# Patient Record
Sex: FEMALE | Race: WHITE | Employment: UNEMPLOYED | URBAN - METROPOLITAN AREA
[De-identification: names, ages, dates, MRNs, and addresses within clinical notes are randomized per-mention and may not be internally consistent; named-entity substitution may affect disease eponyms.]

---

## 2017-02-21 ENCOUNTER — TELEPHONE (OUTPATIENT)
Dept: OBGYN CLINIC | Facility: CLINIC | Age: 37
End: 2017-02-21

## 2017-02-28 ENCOUNTER — OFFICE VISIT (OUTPATIENT)
Dept: OBGYN CLINIC | Facility: CLINIC | Age: 37
End: 2017-02-28

## 2017-02-28 VITALS
HEART RATE: 68 BPM | DIASTOLIC BLOOD PRESSURE: 81 MMHG | SYSTOLIC BLOOD PRESSURE: 112 MMHG | WEIGHT: 127 LBS | BODY MASS INDEX: 20.41 KG/M2 | HEIGHT: 66.25 IN

## 2017-02-28 DIAGNOSIS — Z01.419 WOMEN'S ANNUAL ROUTINE GYNECOLOGICAL EXAMINATION: Primary | ICD-10-CM

## 2017-02-28 PROCEDURE — 99395 PREV VISIT EST AGE 18-39: CPT | Performed by: ADVANCED PRACTICE MIDWIFE

## 2017-02-28 NOTE — PROGRESS NOTES
HPI:    Patient ID: Mikki Sharma is a 31year old female who presents for annual exam.  Using condoms for contraception  In a stable non abusive monogamous relationship. Planning pregnancy in 1 year.     Gyn Exam        Review of Systems   Constitutional

## 2017-03-01 LAB — HPV I/H RISK 1 DNA SPEC QL NAA+PROBE: NEGATIVE

## 2017-05-20 ENCOUNTER — HOSPITAL ENCOUNTER (EMERGENCY)
Facility: HOSPITAL | Age: 37
Discharge: HOME | End: 2017-05-20
Attending: EMERGENCY MEDICINE
Payer: COMMERCIAL

## 2017-05-20 VITALS
WEIGHT: 125 LBS | HEIGHT: 66 IN | SYSTOLIC BLOOD PRESSURE: 100 MMHG | OXYGEN SATURATION: 100 % | DIASTOLIC BLOOD PRESSURE: 61 MMHG | TEMPERATURE: 98 F | BODY MASS INDEX: 20.09 KG/M2 | RESPIRATION RATE: 18 BRPM | HEART RATE: 52 BPM

## 2017-05-20 DIAGNOSIS — R55 SYNCOPE, VASOVAGAL: ICD-10-CM

## 2017-05-20 DIAGNOSIS — M54.31 SCIATICA OF RIGHT SIDE: Primary | ICD-10-CM

## 2017-05-20 PROCEDURE — 96361 HYDRATE IV INFUSION ADD-ON: CPT

## 2017-05-20 PROCEDURE — 99284 EMERGENCY DEPT VISIT MOD MDM: CPT

## 2017-05-20 PROCEDURE — 81025 URINE PREGNANCY TEST: CPT

## 2017-05-20 PROCEDURE — 87086 URINE CULTURE/COLONY COUNT: CPT | Performed by: EMERGENCY MEDICINE

## 2017-05-20 PROCEDURE — 85025 COMPLETE CBC W/AUTO DIFF WBC: CPT | Performed by: EMERGENCY MEDICINE

## 2017-05-20 PROCEDURE — 81001 URINALYSIS AUTO W/SCOPE: CPT | Performed by: EMERGENCY MEDICINE

## 2017-05-20 PROCEDURE — 93010 ELECTROCARDIOGRAM REPORT: CPT | Performed by: EMERGENCY MEDICINE

## 2017-05-20 PROCEDURE — 93005 ELECTROCARDIOGRAM TRACING: CPT

## 2017-05-20 PROCEDURE — 96374 THER/PROPH/DIAG INJ IV PUSH: CPT

## 2017-05-20 PROCEDURE — 80048 BASIC METABOLIC PNL TOTAL CA: CPT | Performed by: EMERGENCY MEDICINE

## 2017-05-20 RX ORDER — CYCLOBENZAPRINE HCL 10 MG
10 TABLET ORAL 3 TIMES DAILY PRN
Qty: 20 TABLET | Refills: 0 | Status: SHIPPED | OUTPATIENT
Start: 2017-05-20 | End: 2017-05-27

## 2017-05-20 RX ORDER — IBUPROFEN 600 MG/1
600 TABLET ORAL EVERY 8 HOURS PRN
Qty: 30 TABLET | Refills: 0 | Status: SHIPPED | OUTPATIENT
Start: 2017-05-20 | End: 2017-05-27

## 2017-05-20 RX ORDER — KETOROLAC TROMETHAMINE 30 MG/ML
15 INJECTION, SOLUTION INTRAMUSCULAR; INTRAVENOUS ONCE
Status: COMPLETED | OUTPATIENT
Start: 2017-05-20 | End: 2017-05-20

## 2017-05-20 NOTE — ED INITIAL ASSESSMENT (HPI)
Pt is c/o back pain for 2 weeks and she went to the bathroom last night and passed out. Pt denies HA or dizziness.

## 2017-05-20 NOTE — ED PROVIDER NOTES
Patient Seen in: [facility] Emergency Department    History   Patient presents with:  Back Pain (musculoskeletal)    Stated Complaint: back pain    HPI    82-year-old female presents for syncopal episode.   Patient states that she woke up to use th Tab,  Take 1 tablet (10 mg total) by mouth 3 (three) times daily as needed for Muscle spasms. Misc.  Devices (BREAST PUMP) Does not apply Misc,  AS DIRECTED Please supply a double electric breast pump that is covered under patient's insurance Due Date 12/ 100%  LMP 05/08/2017        Physical Exam   Constitutional: She is oriented to person, place, and time. She appears well-developed and well-nourished. HENT:   Head: Normocephalic and atraumatic.    Right Ear: External ear normal.   Left Ear: External ear Right knee: Normal.        Left knee: Normal.        Right ankle: Normal.        Left ankle: Normal.        Cervical back: Normal.        Thoracic back: Normal.        Lumbar back: Normal. She exhibits normal range of motion, no tenderness, no bony tendern DRAW GOLD   RAINBOW DRAW LAVENDER   RAINBOW DRAW LIGHT GREEN   RAINBOW DRAW DARK GREEN   RAINBOW DRAW LAVENDER TALL (BNP)   URINE CULTURE, ROUTINE      EKG    Rate, intervals and axes as noted on EKG Report.   Rate: 55  Rhythm: Sinus Rhythm  Reading: Bradyc visit  Primary care follow up if you don't have a PCP      Medications Prescribed:  Discharge Medication List as of 5/20/2017 10:54 AM    START taking these medications    ibuprofen 600 MG Oral Tab  Take 1 tablet (600 mg total) by mouth every 8 (eight) amie

## 2017-06-16 ENCOUNTER — HOSPITAL ENCOUNTER (EMERGENCY)
Facility: HOSPITAL | Age: 37
Discharge: HOME | End: 2017-06-16
Payer: COMMERCIAL

## 2017-06-16 VITALS
OXYGEN SATURATION: 99 % | WEIGHT: 125 LBS | DIASTOLIC BLOOD PRESSURE: 54 MMHG | SYSTOLIC BLOOD PRESSURE: 89 MMHG | BODY MASS INDEX: 20.09 KG/M2 | HEART RATE: 62 BPM | TEMPERATURE: 98 F | HEIGHT: 66 IN | RESPIRATION RATE: 16 BRPM

## 2017-06-16 DIAGNOSIS — B34.9 VIRAL ILLNESS: Primary | ICD-10-CM

## 2017-06-16 PROCEDURE — 99284 EMERGENCY DEPT VISIT MOD MDM: CPT

## 2017-06-16 PROCEDURE — 81001 URINALYSIS AUTO W/SCOPE: CPT | Performed by: NURSE PRACTITIONER

## 2017-06-16 PROCEDURE — 96361 HYDRATE IV INFUSION ADD-ON: CPT

## 2017-06-16 PROCEDURE — 96374 THER/PROPH/DIAG INJ IV PUSH: CPT

## 2017-06-16 PROCEDURE — 85025 COMPLETE CBC W/AUTO DIFF WBC: CPT | Performed by: NURSE PRACTITIONER

## 2017-06-16 PROCEDURE — 80076 HEPATIC FUNCTION PANEL: CPT | Performed by: NURSE PRACTITIONER

## 2017-06-16 PROCEDURE — 83690 ASSAY OF LIPASE: CPT | Performed by: NURSE PRACTITIONER

## 2017-06-16 PROCEDURE — 80048 BASIC METABOLIC PNL TOTAL CA: CPT | Performed by: NURSE PRACTITIONER

## 2017-06-16 RX ORDER — ONDANSETRON 2 MG/ML
4 INJECTION INTRAMUSCULAR; INTRAVENOUS ONCE
Status: COMPLETED | OUTPATIENT
Start: 2017-06-16 | End: 2017-06-16

## 2017-06-16 NOTE — ED INITIAL ASSESSMENT (HPI)
Pt came in for N/V/D since Tuesday. Went to [facility] last night but still unable to keep food down. Afebrile. Reports daughter had similar symptoms last week. RR even and nonlabored, ambulatory with steady gait.

## 2017-06-16 NOTE — ED PROVIDER NOTES
Patient Seen in: [facility] AND [facility] Emergency Department    History   Patient presents with:  Nausea/Vomiting/Diarrhea (gastrointestinal)    Stated Complaint: vomiting and diarrhea.      HPI    82-year-old female presents to the emergency department compla Alcohol Use: Yes           2.0 oz/week       4 Glasses of wine per week       Comment: social      Review of Systems    Positive for stated complaint: vomiting and diarrhea. Other systems are as noted in HPI.   Constitutional and vital sign 18 (*)     All other components within normal limits   BASIC METABOLIC PANEL (8) - Normal   HEPATIC FUNCTION PANEL (7) - Normal   CBC WITH DIFFERENTIAL WITH PLATELET    Narrative:      The following orders were created for panel order CBC WITH DIFFERENTIAL

## 2017-09-18 ENCOUNTER — OFFICE VISIT (OUTPATIENT)
Dept: OBGYN CLINIC | Facility: CLINIC | Age: 37
End: 2017-09-18

## 2017-09-18 VITALS
DIASTOLIC BLOOD PRESSURE: 70 MMHG | SYSTOLIC BLOOD PRESSURE: 106 MMHG | BODY MASS INDEX: 20 KG/M2 | WEIGHT: 123 LBS | HEART RATE: 58 BPM

## 2017-09-18 DIAGNOSIS — N92.6 MISSED MENSES: Primary | ICD-10-CM

## 2017-09-18 LAB
CONTROL LINE PRESENT WITH A CLEAR BACKGROUND (YES/NO): YES YES/NO
KIT LOT #: 0 NUMERIC

## 2017-09-18 PROCEDURE — 99213 OFFICE O/P EST LOW 20 MIN: CPT | Performed by: ADVANCED PRACTICE MIDWIFE

## 2017-09-18 PROCEDURE — 81025 URINE PREGNANCY TEST: CPT | Performed by: ADVANCED PRACTICE MIDWIFE

## 2017-09-19 NOTE — PROGRESS NOTES
HPI:   Liz Kirkland is a 31year old female who presents for a missed menses visit. Happy about pregnancy.      Wt Readings from Last 3 Encounters:  09/18/17 : 123 lb (55.8 kg)  06/16/17 : 125 lb (56.7 kg)  05/20/17 : 125 lb (56.7 kg)    Body mass index i Social History:   Smoking status: Never Smoker                                                              Smokeless tobacco: Never Used                      Alcohol use: Yes           2.0 oz/week     Glasses of wine: 4 per week     Comment: social

## 2017-09-20 ENCOUNTER — APPOINTMENT (OUTPATIENT)
Dept: LAB | Facility: HOSPITAL | Age: 37
End: 2017-09-20
Attending: ADVANCED PRACTICE MIDWIFE
Payer: COMMERCIAL

## 2017-09-20 ENCOUNTER — HOSPITAL ENCOUNTER (OUTPATIENT)
Dept: ULTRASOUND IMAGING | Facility: HOSPITAL | Age: 37
Discharge: HOME | End: 2017-09-20
Attending: ADVANCED PRACTICE MIDWIFE
Payer: COMMERCIAL

## 2017-09-20 ENCOUNTER — OFFICE VISIT (OUTPATIENT)
Dept: OBGYN CLINIC | Facility: CLINIC | Age: 37
End: 2017-09-20

## 2017-09-20 ENCOUNTER — TELEPHONE (OUTPATIENT)
Dept: PEDIATRICS CLINIC | Facility: CLINIC | Age: 37
End: 2017-09-20

## 2017-09-20 ENCOUNTER — TELEPHONE (OUTPATIENT)
Dept: OBGYN CLINIC | Facility: CLINIC | Age: 37
End: 2017-09-20

## 2017-09-20 VITALS
BODY MASS INDEX: 19.53 KG/M2 | SYSTOLIC BLOOD PRESSURE: 108 MMHG | WEIGHT: 123 LBS | HEIGHT: 66.5 IN | DIASTOLIC BLOOD PRESSURE: 74 MMHG | HEART RATE: 57 BPM

## 2017-09-20 DIAGNOSIS — O20.0 THREATENED ABORTION IN EARLY PREGNANCY: Primary | ICD-10-CM

## 2017-09-20 DIAGNOSIS — O20.9 VAGINAL BLEEDING IN PREGNANCY, FIRST TRIMESTER: Primary | ICD-10-CM

## 2017-09-20 DIAGNOSIS — O20.9 VAGINAL BLEEDING IN PREGNANCY, FIRST TRIMESTER: ICD-10-CM

## 2017-09-20 DIAGNOSIS — O20.9 BLEEDING IN EARLY PREGNANCY: ICD-10-CM

## 2017-09-20 LAB
B-HCG SERPL-ACNC: 2031 MIU/ML
PROGEST SERPL-MCNC: 2.8 NG/ML

## 2017-09-20 PROCEDURE — 84144 ASSAY OF PROGESTERONE: CPT

## 2017-09-20 PROCEDURE — 36415 COLL VENOUS BLD VENIPUNCTURE: CPT

## 2017-09-20 PROCEDURE — 99214 OFFICE O/P EST MOD 30 MIN: CPT | Performed by: ADVANCED PRACTICE MIDWIFE

## 2017-09-20 PROCEDURE — 76801 OB US < 14 WKS SINGLE FETUS: CPT | Performed by: ADVANCED PRACTICE MIDWIFE

## 2017-09-20 PROCEDURE — 76817 TRANSVAGINAL US OBSTETRIC: CPT | Performed by: ADVANCED PRACTICE MIDWIFE

## 2017-09-20 PROCEDURE — 84702 CHORIONIC GONADOTROPIN TEST: CPT

## 2017-09-20 NOTE — TELEPHONE ENCOUNTER
Spoke with pt who reports today at [facility] [address] she began having red/brown spotting. Pt reports she is wearing a pantiliner and has not soaked it. Pt denies abdominal cramping. Pt states she had intercourse 2 days ago and had a BM this AM with slight straining.  Pt

## 2017-09-20 NOTE — PROGRESS NOTES
HPI:   Dylon Witt is a 31year old female who presents for Patient presents with:  Gyn Exam: Spotting in early pregnancy pinkish brown  from 8am til 2pm  [place] 2 days ago.      Wt Readings from Last 3 Encounters:  09/20/17 : 123 lb (55.8 kg)  09/1 Maternal Grandmother      Ankylosing Spondyltis   • Dementia Father       Social History:   Smoking status: Never Smoker                                                              Smokeless tobacco: Never Used                      Alcohol use:  Yes management at this time.  Follow HCGs weekly until normal.       Chao Hicks CNM  9/20/2017  5:15 PM

## 2017-09-21 ENCOUNTER — TELEPHONE (OUTPATIENT)
Dept: PEDIATRICS CLINIC | Facility: CLINIC | Age: 37
End: 2017-09-21

## 2017-09-21 DIAGNOSIS — O03.9 SAB (SPONTANEOUS ABORTION): Primary | ICD-10-CM

## 2017-09-21 NOTE — TELEPHONE ENCOUNTER
Spoke to patient, stated she has been having cramping and bleeding since midnight. Patient stated she did have couple clots. Patient stated the cramping and bleeding was worse yesterday.  Patient stated bleeding is heavy now but now soaking more than a pad

## 2017-09-21 NOTE — TELEPHONE ENCOUNTER
Per MJ on call, patient is to monitor and take ibuprofen as needed for the pain. Patient informed mj suggesting to repeat levels tomorrow. Patient informed they will monitor the levels until back to zero.  Patient advise she should notice the bleeding to st

## 2017-09-22 ENCOUNTER — APPOINTMENT (OUTPATIENT)
Dept: LAB | Facility: HOSPITAL | Age: 37
End: 2017-09-22
Attending: ADVANCED PRACTICE MIDWIFE
Payer: COMMERCIAL

## 2017-09-22 DIAGNOSIS — O03.9 SAB (SPONTANEOUS ABORTION): ICD-10-CM

## 2017-09-22 LAB — B-HCG SERPL-ACNC: 324.2 MIU/ML

## 2017-09-22 PROCEDURE — 36415 COLL VENOUS BLD VENIPUNCTURE: CPT

## 2017-09-22 PROCEDURE — 84702 CHORIONIC GONADOTROPIN TEST: CPT

## 2017-09-23 DIAGNOSIS — O03.9 SPONTANEOUS MISCARRIAGE: Primary | ICD-10-CM

## 2017-10-10 ENCOUNTER — APPOINTMENT (OUTPATIENT)
Dept: LAB | Facility: HOSPITAL | Age: 37
End: 2017-10-10
Attending: ADVANCED PRACTICE MIDWIFE
Payer: COMMERCIAL

## 2017-10-10 DIAGNOSIS — O03.9 SAB (SPONTANEOUS ABORTION): ICD-10-CM

## 2017-10-10 PROCEDURE — 84702 CHORIONIC GONADOTROPIN TEST: CPT

## 2017-10-10 PROCEDURE — 36415 COLL VENOUS BLD VENIPUNCTURE: CPT

## 2017-10-11 ENCOUNTER — OFFICE VISIT (OUTPATIENT)
Dept: OBGYN CLINIC | Facility: CLINIC | Age: 37
End: 2017-10-11

## 2017-10-11 VITALS
HEIGHT: 66 IN | DIASTOLIC BLOOD PRESSURE: 78 MMHG | HEART RATE: 80 BPM | WEIGHT: 124 LBS | BODY MASS INDEX: 19.93 KG/M2 | SYSTOLIC BLOOD PRESSURE: 121 MMHG

## 2017-10-11 DIAGNOSIS — O03.9 COMPLETE ABORTION WITHOUT COMPLICATION: Primary | ICD-10-CM

## 2017-10-11 PROCEDURE — 99213 OFFICE O/P EST LOW 20 MIN: CPT | Performed by: ADVANCED PRACTICE MIDWIFE

## 2017-10-19 NOTE — PROGRESS NOTES
HPI:   Celia Fine is a 30year old female who presents for a follow up from [facility]. Stopped bleeding. Coping well. Has questions about future pregnancy.     Wt Readings from Last 3 Encounters:  10/11/17 : 124 lb (56.2 kg)  09/20/17 : 123 lb (55.8 kg)  09/1 Grandmother      Ankylosing Spondyltis   • Dementia Father       Social History:   Smoking status: Never Smoker                                                              Smokeless tobacco: Never Used                      Alcohol use: Yes           2.0 o

## 2017-12-14 ENCOUNTER — TELEPHONE (OUTPATIENT)
Dept: OBGYN CLINIC | Facility: CLINIC | Age: 37
End: 2017-12-14

## 2017-12-14 NOTE — TELEPHONE ENCOUNTER
Pt states she did not have it done because last result was 2.7, which was close to zero & it was $20 for the test. Pt states she has just gotten her 3rd period since her miscarriage.  Advised pt I will remove order per her request. Pt verbalized an Juany

## 2018-02-07 ENCOUNTER — TELEPHONE (OUTPATIENT)
Dept: OBGYN CLINIC | Facility: CLINIC | Age: 38
End: 2018-02-07

## 2018-02-07 NOTE — TELEPHONE ENCOUNTER
Pt had light spotting yesterday & some brown discharge w/ cramping today. Not a heavy flow. Pt to have pap today. Advised pt that pap can be done except for when she has a heavy flow. Appt for 1800 today. Pt will call to reschedule if flow increases.  Pt ve

## 2018-02-15 ENCOUNTER — OFFICE VISIT (OUTPATIENT)
Dept: OBGYN CLINIC | Facility: CLINIC | Age: 38
End: 2018-02-15

## 2018-02-15 VITALS
BODY MASS INDEX: 20 KG/M2 | HEART RATE: 85 BPM | SYSTOLIC BLOOD PRESSURE: 93 MMHG | DIASTOLIC BLOOD PRESSURE: 65 MMHG | WEIGHT: 126.19 LBS

## 2018-02-15 DIAGNOSIS — Z32.00 PREGNANCY EXAMINATION OR TEST, PREGNANCY UNCONFIRMED: ICD-10-CM

## 2018-02-15 DIAGNOSIS — Z31.9 PATIENT DESIRES PREGNANCY: ICD-10-CM

## 2018-02-15 DIAGNOSIS — Z01.419 ENCOUNTER FOR ANNUAL ROUTINE GYNECOLOGICAL EXAMINATION: Primary | ICD-10-CM

## 2018-02-15 LAB
CONTROL LINE PRESENT WITH A CLEAR BACKGROUND (YES/NO): YES YES/NO
KIT LOT #: 0 NUMERIC
PREGNANCY TEST, URINE: NEGATIVE

## 2018-02-15 PROCEDURE — 99395 PREV VISIT EST AGE 18-39: CPT | Performed by: ADVANCED PRACTICE MIDWIFE

## 2018-02-15 PROCEDURE — 81025 URINE PREGNANCY TEST: CPT | Performed by: ADVANCED PRACTICE MIDWIFE

## 2018-02-15 RX ORDER — INFLUENZA A VIRUS A/CHRISTCHURCH/16/2010 NIB-74 (H1N1) HEMAGGLUTININ ANTIGEN (PROPIOLACTONE INACTIVATED), INFLUENZA A VIRUS A/SWITZERLAND/9715293/2013, NIB-88 (H3N2) HEMAGGLUTININ ANTIGEN (PROPIOLACTONE INACTIVATED), INFLUENZA B VIRUS B/PHUKET/3073/2013 - WILD TYPE HEMAGGLUTININ ANTIGEN (PROPIOLACTONE INACTIVATED) 15; 15; 15 UG/.5ML; UG/.5ML; UG/.5ML
INJECTION, SUSPENSION INTRAMUSCULAR
Refills: 0 | COMMUNITY
Start: 2018-01-06 | End: 2018-06-27

## 2018-02-15 NOTE — PROGRESS NOTES
HPI:    Patient ID: Carlos Vazquez is a 30year old female who presents for her annual exam. Pt has a hx of ASCUS pap with negative HPV. Desires pregnancy and is taking Folic acid daily  Denies ETOH, drug or tobacco use.   Lives in a stable monogamous rela dry.   Psychiatric: She has a normal mood and affect.  Her behavior is normal.              ASSESSMENT/PLAN:   Encounter for annual routine gynecological examination  (primary encounter diagnosis)  Pregnancy examination or test, pregnancy unconfirmed  Patie

## 2018-02-16 LAB — HPV I/H RISK 1 DNA SPEC QL NAA+PROBE: NEGATIVE

## 2018-06-07 ENCOUNTER — APPOINTMENT (OUTPATIENT)
Dept: LAB | Facility: HOSPITAL | Age: 38
End: 2018-06-07
Attending: ADVANCED PRACTICE MIDWIFE
Payer: COMMERCIAL

## 2018-06-07 ENCOUNTER — TELEPHONE (OUTPATIENT)
Dept: OBGYN CLINIC | Facility: CLINIC | Age: 38
End: 2018-06-07

## 2018-06-07 ENCOUNTER — OFFICE VISIT (OUTPATIENT)
Dept: OBGYN CLINIC | Facility: CLINIC | Age: 38
End: 2018-06-07

## 2018-06-07 VITALS
WEIGHT: 125 LBS | BODY MASS INDEX: 20 KG/M2 | DIASTOLIC BLOOD PRESSURE: 72 MMHG | HEART RATE: 68 BPM | SYSTOLIC BLOOD PRESSURE: 108 MMHG

## 2018-06-07 DIAGNOSIS — O36.80X0 PREGNANCY WITH UNCERTAIN FETAL VIABILITY, SINGLE OR UNSPECIFIED FETUS: Primary | ICD-10-CM

## 2018-06-07 DIAGNOSIS — N92.6 MISSED MENSES: ICD-10-CM

## 2018-06-07 DIAGNOSIS — Z87.59 HISTORY OF ONE MISCARRIAGE: ICD-10-CM

## 2018-06-07 DIAGNOSIS — N92.6 MISSED MENSES: Primary | ICD-10-CM

## 2018-06-07 PROCEDURE — 36415 COLL VENOUS BLD VENIPUNCTURE: CPT

## 2018-06-07 PROCEDURE — 84144 ASSAY OF PROGESTERONE: CPT

## 2018-06-07 PROCEDURE — 84702 CHORIONIC GONADOTROPIN TEST: CPT

## 2018-06-07 PROCEDURE — 99213 OFFICE O/P EST LOW 20 MIN: CPT | Performed by: ADVANCED PRACTICE MIDWIFE

## 2018-06-07 PROCEDURE — 81025 URINE PREGNANCY TEST: CPT | Performed by: ADVANCED PRACTICE MIDWIFE

## 2018-06-07 NOTE — PROGRESS NOTES
Pt is a  with hx of  A  and early SAB. Denies any bleeding or cramping. HCG today and in 48 hrs  1. Discussed importance of folic acid, calcium.    2.  Reviewed pregnancy recommendations regarding weight gain, diet, fish consumption, consumption o

## 2018-06-07 NOTE — TELEPHONE ENCOUNTER
----- Message from Catherine Denton CNM sent at 6/7/2018  1:38 PM CDT -----  BHCG and progesterone look good  Repeat BHCG in 48 hrs  Order is in

## 2018-06-09 ENCOUNTER — LAB ENCOUNTER (OUTPATIENT)
Dept: LAB | Facility: HOSPITAL | Age: 38
End: 2018-06-09
Attending: ADVANCED PRACTICE MIDWIFE
Payer: COMMERCIAL

## 2018-06-09 DIAGNOSIS — O36.80X0 PREGNANCY WITH UNCERTAIN FETAL VIABILITY, SINGLE OR UNSPECIFIED FETUS: ICD-10-CM

## 2018-06-09 LAB — B-HCG SERPL-ACNC: 6958 MIU/ML

## 2018-06-09 PROCEDURE — 84702 CHORIONIC GONADOTROPIN TEST: CPT

## 2018-06-09 PROCEDURE — 36415 COLL VENOUS BLD VENIPUNCTURE: CPT

## 2018-06-14 ENCOUNTER — TELEPHONE (OUTPATIENT)
Dept: OBGYN CLINIC | Facility: CLINIC | Age: 38
End: 2018-06-14

## 2018-06-14 DIAGNOSIS — Z3A.01 LESS THAN 8 WEEKS GESTATION OF PREGNANCY: Primary | ICD-10-CM

## 2018-06-14 NOTE — TELEPHONE ENCOUNTER
----- Message from Yulisa Serrano CNM sent at 6/14/2018  2:35 PM CDT -----  HCG just about doubled  Offer u/s for viability if desires   RN NOB visit

## 2018-06-14 NOTE — TELEPHONE ENCOUNTER
Spoke with pt and advised per MES her HCG just about doubled. Pt was offered U/S for viability, pt desires. Order placed for U/S and instructions given. Pt scheduled for phone nurse ed visit. Pt agreed and voiced understanding.

## 2018-06-20 ENCOUNTER — HOSPITAL ENCOUNTER (OUTPATIENT)
Dept: ULTRASOUND IMAGING | Facility: HOSPITAL | Age: 38
Discharge: HOME | End: 2018-06-20
Attending: ADVANCED PRACTICE MIDWIFE
Payer: COMMERCIAL

## 2018-06-20 ENCOUNTER — TELEPHONE (OUTPATIENT)
Dept: OBGYN CLINIC | Facility: CLINIC | Age: 38
End: 2018-06-20

## 2018-06-20 ENCOUNTER — NURSE ONLY (OUTPATIENT)
Dept: OBGYN CLINIC | Facility: CLINIC | Age: 38
End: 2018-06-20

## 2018-06-20 ENCOUNTER — OFFICE VISIT (OUTPATIENT)
Dept: OBGYN CLINIC | Facility: CLINIC | Age: 38
End: 2018-06-20

## 2018-06-20 VITALS
BODY MASS INDEX: 20 KG/M2 | HEART RATE: 63 BPM | DIASTOLIC BLOOD PRESSURE: 71 MMHG | WEIGHT: 126 LBS | SYSTOLIC BLOOD PRESSURE: 107 MMHG

## 2018-06-20 DIAGNOSIS — O20.0 THREATENED ABORTION: ICD-10-CM

## 2018-06-20 DIAGNOSIS — O20.0 THREATENED ABORTION: Primary | ICD-10-CM

## 2018-06-20 DIAGNOSIS — O20.9 BLEEDING IN EARLY PREGNANCY: Primary | ICD-10-CM

## 2018-06-20 PROCEDURE — 76801 OB US < 14 WKS SINGLE FETUS: CPT | Performed by: ADVANCED PRACTICE MIDWIFE

## 2018-06-20 PROCEDURE — 76817 TRANSVAGINAL US OBSTETRIC: CPT | Performed by: ADVANCED PRACTICE MIDWIFE

## 2018-06-20 PROCEDURE — 99213 OFFICE O/P EST LOW 20 MIN: CPT | Performed by: ADVANCED PRACTICE MIDWIFE

## 2018-06-20 NOTE — TELEPHONE ENCOUNTER
Pt reported she has been having light pink - dk brown vaginal spotting with wiping since Friday and slight abdominal cramping. Pt states she moved this past weekend and things are hectic. Pt has an US scheduled for tomorrow night.   Blood Type is O Positi

## 2018-06-20 NOTE — PROGRESS NOTES
[facility] Focused Gynecology Problem Exam    Tierra Yang is a 30year old female presenting for Gyn Exam (had positive pregnancy test at home. started spotting on and off since Thursday )  .     HPI:   Patient presents with:  Gyn Exam: h Social History Main Topics   Smoking status: Never Smoker    Smokeless tobacco: Never Used    Alcohol use Yes  2.0 oz/week    4 Glasses of wine per week         Comment: social    Drug use: No    Sexual activity: Not on file     Other Topics Concern the right side of the gestational sac measuring 10 x 4 x 6 mm. PLAN:   NOB ed visit  F/U if bleeding increases  Warning signs reviewed    ORDERS:   No orders of the defined types were placed in this encounter.     PRESCRIPTIONS:     No prescriptions requ

## 2018-06-21 NOTE — PROGRESS NOTES
Appointment was cx'ed due to pt was having vaginal bleeding and needed a Hold and Call US and appointment with the midwife.

## 2018-06-27 ENCOUNTER — NURSE ONLY (OUTPATIENT)
Dept: OBGYN CLINIC | Facility: CLINIC | Age: 38
End: 2018-06-27

## 2018-06-27 DIAGNOSIS — O09.521 AMA (ADVANCED MATERNAL AGE) MULTIGRAVIDA 35+, FIRST TRIMESTER: Primary | ICD-10-CM

## 2018-06-27 PROBLEM — O09.529 AMA (ADVANCED MATERNAL AGE) MULTIGRAVIDA 35+ (HCC): Status: ACTIVE | Noted: 2018-06-27

## 2018-06-27 PROBLEM — O09.529 AMA (ADVANCED MATERNAL AGE) MULTIGRAVIDA 35+: Status: ACTIVE | Noted: 2018-06-27

## 2018-06-27 NOTE — PROGRESS NOTES
Phone Consult. NOB Education complete and information set aside for pt to . Pt is AMA. 1 Hr GTT, HA1C and TSH ordered with NOB Labs. MFM consult ordered with FTS and Cell Free Fetal DNA testing. Pt will call for appt. NPN appt scheduled.

## 2018-06-27 NOTE — ASSESSMENT & PLAN NOTE
MFM consult ordered. FTS and Cell Free Fetal DNA ordered.   1 Hr GTT, HA1C and TSH ordered with NOB Labs

## 2018-07-09 ENCOUNTER — LAB ENCOUNTER (OUTPATIENT)
Dept: LAB | Facility: HOSPITAL | Age: 38
End: 2018-07-09
Attending: ADVANCED PRACTICE MIDWIFE
Payer: COMMERCIAL

## 2018-07-09 DIAGNOSIS — O09.521 AMA (ADVANCED MATERNAL AGE) MULTIGRAVIDA 35+, FIRST TRIMESTER: ICD-10-CM

## 2018-07-09 LAB
ANTIBODY SCREEN: NEGATIVE
BASOPHILS # BLD: 0 K/UL (ref 0–0.2)
BASOPHILS NFR BLD: 0 %
EOSINOPHIL # BLD: 0 K/UL (ref 0–0.7)
EOSINOPHIL NFR BLD: 0 %
ERYTHROCYTE [DISTWIDTH] IN BLOOD BY AUTOMATED COUNT: 12.1 % (ref 11–15)
GLUCOSE 1H P 50 G GLC PO SERPL-MCNC: 183 MG/DL
HCT VFR BLD AUTO: 39.9 % (ref 35–48)
HGB BLD-MCNC: 13.7 G/DL (ref 12–16)
LYMPHOCYTES # BLD: 1.7 K/UL (ref 1–4)
LYMPHOCYTES NFR BLD: 21 %
MCH RBC QN AUTO: 32.5 PG (ref 27–32)
MCHC RBC AUTO-ENTMCNC: 34.3 G/DL (ref 32–37)
MCV RBC AUTO: 94.7 FL (ref 80–100)
MONOCYTES # BLD: 0.5 K/UL (ref 0–1)
MONOCYTES NFR BLD: 6 %
NEUTROPHILS # BLD AUTO: 5.9 K/UL (ref 1.8–7.7)
NEUTROPHILS NFR BLD: 72 %
PLATELET # BLD AUTO: 182 K/UL (ref 140–400)
PMV BLD AUTO: 8 FL (ref 7.4–10.3)
RBC # BLD AUTO: 4.21 M/UL (ref 3.7–5.4)
RH BLOOD TYPE: POSITIVE
RUBV IGG SER-ACNC: 48.3 IU/ML
TSH SERPL-ACNC: 1.41 UIU/ML (ref 0.45–5.33)
WBC # BLD AUTO: 8.1 K/UL (ref 4–11)

## 2018-07-09 PROCEDURE — 86803 HEPATITIS C AB TEST: CPT

## 2018-07-09 PROCEDURE — 86900 BLOOD TYPING SEROLOGIC ABO: CPT

## 2018-07-09 PROCEDURE — 87389 HIV-1 AG W/HIV-1&-2 AB AG IA: CPT

## 2018-07-09 PROCEDURE — 87086 URINE CULTURE/COLONY COUNT: CPT

## 2018-07-09 PROCEDURE — 83036 HEMOGLOBIN GLYCOSYLATED A1C: CPT

## 2018-07-09 PROCEDURE — 82950 GLUCOSE TEST: CPT

## 2018-07-09 PROCEDURE — 86901 BLOOD TYPING SEROLOGIC RH(D): CPT

## 2018-07-09 PROCEDURE — 85025 COMPLETE CBC W/AUTO DIFF WBC: CPT

## 2018-07-09 PROCEDURE — 84443 ASSAY THYROID STIM HORMONE: CPT

## 2018-07-09 PROCEDURE — 86762 RUBELLA ANTIBODY: CPT

## 2018-07-09 PROCEDURE — 87340 HEPATITIS B SURFACE AG IA: CPT

## 2018-07-09 PROCEDURE — 36415 COLL VENOUS BLD VENIPUNCTURE: CPT

## 2018-07-09 PROCEDURE — 86850 RBC ANTIBODY SCREEN: CPT

## 2018-07-09 PROCEDURE — 86780 TREPONEMA PALLIDUM: CPT

## 2018-07-10 LAB
HBA1C MFR BLD: 5.1 % (ref 4–6)
HBV SURFACE AG SERPL QL IA: NONREACTIVE
HCV AB SERPL QL IA: NONREACTIVE
HIV1+2 AB SERPL QL IA: NONREACTIVE

## 2018-07-11 LAB — T PALLIDUM AB SER QL: NEGATIVE

## 2018-07-20 ENCOUNTER — TELEPHONE (OUTPATIENT)
Dept: OBGYN CLINIC | Facility: CLINIC | Age: 38
End: 2018-07-20

## 2018-07-20 NOTE — TELEPHONE ENCOUNTER
Spoke with pt and advised it is okay to keep Monday appt and she will be notified by phone about FTS results. Pt agreed and voiced understanding.

## 2018-07-20 NOTE — TELEPHONE ENCOUNTER
Pt has her New Ob appt on 7/23 she doesn't have her US with MFM until Friday 7/27. Pt wondering if ok to keep Monday appt.  Please advise

## 2018-07-23 ENCOUNTER — INITIAL PRENATAL (OUTPATIENT)
Dept: OBGYN CLINIC | Facility: CLINIC | Age: 38
End: 2018-07-23
Payer: COMMERCIAL

## 2018-07-23 VITALS
DIASTOLIC BLOOD PRESSURE: 64 MMHG | HEART RATE: 62 BPM | BODY MASS INDEX: 21 KG/M2 | SYSTOLIC BLOOD PRESSURE: 98 MMHG | WEIGHT: 131.38 LBS

## 2018-07-23 DIAGNOSIS — O99.810 ABNORMAL GLUCOSE TOLERANCE TEST (GTT) DURING PREGNANCY, ANTEPARTUM: ICD-10-CM

## 2018-07-23 DIAGNOSIS — Z34.81 ENCOUNTER FOR SUPERVISION OF OTHER NORMAL PREGNANCY IN FIRST TRIMESTER: Primary | ICD-10-CM

## 2018-07-23 LAB
APPEARANCE: CLEAR
MULTISTIX LOT#: NORMAL NUMERIC
PH, URINE: 7 (ref 4.5–8)
SPECIFIC GRAVITY: 1.01 (ref 1–1.03)
URINE-COLOR: YELLOW
UROBILINOGEN,SEMI-QN: 0.2 MG/DL (ref 0–1.9)

## 2018-07-23 PROCEDURE — 81002 URINALYSIS NONAUTO W/O SCOPE: CPT | Performed by: ADVANCED PRACTICE MIDWIFE

## 2018-07-24 PROBLEM — O99.810 GLUCOSE INTOLERANCE OF PREGNANCY: Status: ACTIVE | Noted: 2018-07-24

## 2018-07-24 PROBLEM — O99.810 GLUCOSE INTOLERANCE OF PREGNANCY (HCC): Status: ACTIVE | Noted: 2018-07-24

## 2018-07-24 LAB
C TRACH DNA SPEC QL NAA+PROBE: NEGATIVE
N GONORRHOEA DNA SPEC QL NAA+PROBE: NEGATIVE

## 2018-07-24 NOTE — PROGRESS NOTES
Feeling well, denies cramping or bleeding. Reviewed prenatal labs including failed 1 hour glucose. Reviewed need for 3 hour and 2 hour at 28 weeks. Normal PE, pap with inflammatory changes 2/18. Reviewed warning signs and anticipated prenatal care.

## 2018-07-26 NOTE — PROGRESS NOTES
Outpatient Maternal-Fetal Medicine Consultation    Dear Ms. Frazier,    Thank you for requesting ultrasound evaluation and maternal fetal medicine consultation on your patient Rohit Gil.   As you are aware she is a 30year old female with a [place] incontinence; Viral hepatitis C; or Viral infection characterized by skin and mucous membrane lesions.     Past Surgical History  The patient  has a past surgical history that includes other surgical history (7/2014) and colposcopy, cervix w/upper adjacent at 39 weeks gestation for women 35-39 years and at 28 weeks gestation for women 40 years and older) are also advised.  Routine obstetric care is more than adequate to assess for gestational diabetes and preeclampsia; hence, no further significant alteration avert one unexplained fetal death. Hence, weekly NST's are advised for women of advanced maternal age; testing should be initiated at 42 weeks for women 35-39 years and at 26 weeks for women 36 years and older.     Fetal Malformations    Cardiac malformati GDM screen - reiterated the need for a 3 hour GTT soon to determine if the screen was a false positive or if she has glucose intolerance/ diabetes. We discussed the recommended plan of care based on her  risk factors.   Eryn Hidalgo had her questions an

## 2018-07-27 ENCOUNTER — HOSPITAL ENCOUNTER (OUTPATIENT)
Dept: PERINATAL CARE | Facility: HOSPITAL | Age: 38
Discharge: HOME | End: 2018-07-27
Attending: OBSTETRICS & GYNECOLOGY
Payer: COMMERCIAL

## 2018-07-27 ENCOUNTER — HOSPITAL ENCOUNTER (OUTPATIENT)
Dept: PERINATAL CARE | Facility: HOSPITAL | Age: 38
Discharge: HOME | End: 2018-07-27
Attending: ADVANCED PRACTICE MIDWIFE
Payer: COMMERCIAL

## 2018-07-27 VITALS — HEART RATE: 74 BPM | SYSTOLIC BLOOD PRESSURE: 118 MMHG | DIASTOLIC BLOOD PRESSURE: 72 MMHG

## 2018-07-27 DIAGNOSIS — O99.810 GLUCOSE INTOLERANCE OF PREGNANCY: ICD-10-CM

## 2018-07-27 DIAGNOSIS — O09.521 ELDERLY MULTIGRAVIDA IN FIRST TRIMESTER: Primary | ICD-10-CM

## 2018-07-27 DIAGNOSIS — O20.9 VAGINAL BLEEDING AFFECTING EARLY PREGNANCY: ICD-10-CM

## 2018-07-27 DIAGNOSIS — O09.521 ELDERLY MULTIGRAVIDA IN FIRST TRIMESTER: ICD-10-CM

## 2018-07-27 DIAGNOSIS — Z36.9 FIRST TRIMESTER SCREENING: ICD-10-CM

## 2018-07-27 PROCEDURE — 76813 OB US NUCHAL MEAS 1 GEST: CPT | Performed by: OBSTETRICS & GYNECOLOGY

## 2018-07-27 PROCEDURE — 99243 OFF/OP CNSLTJ NEW/EST LOW 30: CPT | Performed by: OBSTETRICS & GYNECOLOGY

## 2018-07-31 ENCOUNTER — LAB ENCOUNTER (OUTPATIENT)
Dept: LAB | Age: 38
End: 2018-07-31
Attending: ADVANCED PRACTICE MIDWIFE
Payer: COMMERCIAL

## 2018-07-31 DIAGNOSIS — O99.810 ABNORMAL GLUCOSE TOLERANCE TEST (GTT) DURING PREGNANCY, ANTEPARTUM: ICD-10-CM

## 2018-07-31 LAB
GLUCOSE 1H P GLC SERPL-MCNC: 109 MG/DL
GLUCOSE 2H P GLC SERPL-MCNC: 86 MG/DL
GLUCOSE 3H P GLC SERPL-MCNC: 64 MG/DL
GLUCOSE P FAST SERPL-MCNC: 81 MG/DL (ref 70–99)

## 2018-07-31 PROCEDURE — 82952 GTT-ADDED SAMPLES: CPT

## 2018-07-31 PROCEDURE — 82951 GLUCOSE TOLERANCE TEST (GTT): CPT

## 2018-07-31 PROCEDURE — 36415 COLL VENOUS BLD VENIPUNCTURE: CPT

## 2018-08-01 ENCOUNTER — TELEPHONE (OUTPATIENT)
Dept: OBGYN CLINIC | Facility: CLINIC | Age: 38
End: 2018-08-01

## 2018-08-01 ENCOUNTER — TELEPHONE (OUTPATIENT)
Dept: PERINATAL CARE | Facility: HOSPITAL | Age: 38
End: 2018-08-01

## 2018-08-01 NOTE — TELEPHONE ENCOUNTER
HARMONY screening results obt  Reviewed by MD Lopez Sees  Low risk for  Trisomy 21  1:34211 risk  Low risk for Trisomy 18/13  1:31107 risk    Fetal sex: in envelope for pt     Pt states understanding  Copy of results sent for scanning into pt record

## 2018-08-09 ENCOUNTER — TELEPHONE (OUTPATIENT)
Dept: OBGYN CLINIC | Facility: CLINIC | Age: 38
End: 2018-08-09

## 2018-08-09 ENCOUNTER — APPOINTMENT (OUTPATIENT)
Dept: ULTRASOUND IMAGING | Age: 38
End: 2018-08-09
Attending: PHYSICIAN ASSISTANT

## 2018-08-09 ENCOUNTER — HOSPITAL ENCOUNTER (EMERGENCY)
Age: 38
Discharge: HOME | End: 2018-08-09

## 2018-08-09 VITALS
OXYGEN SATURATION: 99 % | BODY MASS INDEX: 20.4 KG/M2 | SYSTOLIC BLOOD PRESSURE: 100 MMHG | WEIGHT: 130 LBS | DIASTOLIC BLOOD PRESSURE: 58 MMHG | RESPIRATION RATE: 16 BRPM | TEMPERATURE: 98 F | HEIGHT: 67 IN | HEART RATE: 63 BPM

## 2018-08-09 DIAGNOSIS — O20.0 THREATENED ABORTION AFFECTING INTRAUTERINE PREGNANCY: Primary | ICD-10-CM

## 2018-08-09 LAB
APPEARANCE UR: CLEAR
BILIRUB UR QL STRIP: NEGATIVE
COLOR UR: YELLOW
GLUCOSE UR STRIP-MCNC: NEGATIVE MG/DL
HGB UR QL STRIP: NEGATIVE
KETONES UR STRIP-MCNC: NEGATIVE MG/DL
LEUKOCYTE ESTERASE UR QL STRIP: NEGATIVE
NITRITE UR QL STRIP: NEGATIVE
PH UR STRIP: 7 UNITS (ref 5–7)
PROT UR STRIP-MCNC: NEGATIVE MG/DL
SP GR UR STRIP: <1.005 (ref 1–1.03)
SPECIMEN SOURCE: ABNORMAL
UROBILINOGEN UR STRIP-MCNC: 0.2 MG/DL (ref 0–1)

## 2018-08-09 PROCEDURE — 76815 OB US LIMITED FETUS(S): CPT | Performed by: RADIOLOGY

## 2018-08-09 PROCEDURE — 81003 URINALYSIS AUTO W/O SCOPE: CPT

## 2018-08-09 PROCEDURE — 99284 EMERGENCY DEPT VISIT MOD MDM: CPT

## 2018-08-09 PROCEDURE — 99284 EMERGENCY DEPT VISIT MOD MDM: CPT | Performed by: PHYSICIAN ASSISTANT

## 2018-08-09 PROCEDURE — 10004157 US OB PELVIS LIMITED

## 2018-08-09 PROCEDURE — 36415 COLL VENOUS BLD VENIPUNCTURE: CPT

## 2018-08-09 PROCEDURE — 80047 BASIC METABLC PNL IONIZED CA: CPT

## 2018-08-09 ASSESSMENT — ENCOUNTER SYMPTOMS
APPETITE CHANGE: 0
PHOTOPHOBIA: 0
DIZZINESS: 0
CHILLS: 0
EYE PAIN: 0
ACTIVITY CHANGE: 0
COUGH: 0
NAUSEA: 0
CHEST TIGHTNESS: 0
DIARRHEA: 0
SORE THROAT: 0
CONSTIPATION: 0
SINUS PRESSURE: 0
RHINORRHEA: 0
FATIGUE: 0
VOMITING: 0
SINUS PAIN: 0
LIGHT-HEADEDNESS: 0
ABDOMINAL PAIN: 1
HEADACHES: 0
FEVER: 0
SHORTNESS OF BREATH: 0
NUMBNESS: 0

## 2018-08-09 ASSESSMENT — PAIN SCALES - GENERAL
PAINLEVEL_OUTOF10: 2
PAINLEVEL_OUTOF10: 2

## 2018-08-09 NOTE — TELEPHONE ENCOUNTER
Spoke with pt who states she has been having pink vaginal spotting when wiping since yesterday. Pt states she also has \"a little abdominal cramping\" and she is not sure if it is just gas. Pt reports she is currently out of town.  Pt denies any recent SI.

## 2018-08-13 ENCOUNTER — TELEPHONE (OUTPATIENT)
Dept: OBGYN CLINIC | Facility: CLINIC | Age: 38
End: 2018-08-13

## 2018-08-13 ENCOUNTER — ROUTINE PRENATAL (OUTPATIENT)
Dept: OBGYN CLINIC | Facility: CLINIC | Age: 38
End: 2018-08-13
Payer: COMMERCIAL

## 2018-08-13 VITALS
HEART RATE: 70 BPM | SYSTOLIC BLOOD PRESSURE: 123 MMHG | BODY MASS INDEX: 21 KG/M2 | WEIGHT: 133 LBS | DIASTOLIC BLOOD PRESSURE: 79 MMHG

## 2018-08-13 DIAGNOSIS — O20.9 BLEEDING IN EARLY PREGNANCY: ICD-10-CM

## 2018-08-13 DIAGNOSIS — Z34.82 ENCOUNTER FOR SUPERVISION OF OTHER NORMAL PREGNANCY IN SECOND TRIMESTER: Primary | ICD-10-CM

## 2018-08-13 DIAGNOSIS — O44.20 MARGINAL PLACENTA PREVIA: ICD-10-CM

## 2018-08-13 LAB
MULTISTIX LOT#: NORMAL NUMERIC
PH, URINE: 7 (ref 4.5–8)
SPECIFIC GRAVITY: 1 (ref 1–1.03)
URINE-COLOR: YELLOW
UROBILINOGEN,SEMI-QN: 0 MG/DL (ref 0–1.9)

## 2018-08-13 PROCEDURE — 81002 URINALYSIS NONAUTO W/O SCOPE: CPT | Performed by: ADVANCED PRACTICE MIDWIFE

## 2018-08-13 NOTE — TELEPHONE ENCOUNTER
Pt is 14w3d and she states she has been having vaginal spotting for a while. Pt was in [place] and she went to the ER on Thursday. Pt had OB US and was advised everything was good with the baby. Pt continued to spot on Friday and Saturday. No spotting on Sunday during the day. Pt started spotting Sunday night with wiping only. The spotting is dk red, orange and brown. Pt is having slight cramping. Pt was offered an appt and she prefers to come in after 3 PM.  Requested pt bring all the information from ER visit and to request the [address] report be faxed to our office. Pt states she will call for the report. Pt agrees with plan.

## 2018-08-13 NOTE — TELEPHONE ENCOUNTER
14 WEEKS PREGNANT HAS BEEN SPOTTING, WENT TO A ER AT [facility], PER PT THE SPOTTING IS HEAVIER

## 2018-08-13 NOTE — TELEPHONE ENCOUNTER
Per MBW pt needs [facility] appt this week for consult and U/S. Call placed to [facility] and was given appt on 8/15 at 2:00PM.    Spoke with pt and advised appt with [facility] is on Wednesday 8/15 at 2:00 PM. Pt agreed and voiced understanding.

## 2018-08-14 LAB
ANION GAP BLD CALC-SCNC: 15 MMOL/L
BUN BLD-MCNC: 15 MG/DL (ref 6–20)
CA-I BLD ISE-SCNC: 1.13 MMOL/L (ref 1.15–1.29)
CHLORIDE BLD-SCNC: 102 MMOL/L (ref 98–107)
CO2 BLD-SCNC: 24 MMOL/L (ref 19–24)
CREAT BLD-MCNC: 0.6 MG/DL (ref 0.51–0.95)
CREAT BLD-MCNC: >90 MG/DL
GLUCOSE BLD-MCNC: ABNORMAL MG/DL (ref 65–99)
HCT VFR BLD CALC: 31 % (ref 36–46.5)
HGB BLD-MCNC: 10.5 G/DL (ref 12–15.5)
POTASSIUM BLD-SCNC: 3.6 MMOL/L (ref 3.4–5.1)
SODIUM BLD-SCNC: 135 MMOL/L (ref 135–145)

## 2018-08-14 NOTE — PROGRESS NOTES
Vaginal cultures obtained- mucous and scant dark red blood protruding from cervical os. No lesions on cervix. No vaginal discharge noted.

## 2018-08-14 NOTE — PROGRESS NOTES
Had spotting which started thurs - went to local ER where they said previa is present - otherwise normal exam. Has had bleeding since.  Discussed marginal previa and potential resolution, painless bleeding with previa, danger signs of excessive bleeding, ri

## 2018-08-15 ENCOUNTER — HOSPITAL ENCOUNTER (OUTPATIENT)
Dept: PERINATAL CARE | Facility: HOSPITAL | Age: 38
Discharge: HOME | End: 2018-08-15
Attending: OBSTETRICS & GYNECOLOGY
Payer: COMMERCIAL

## 2018-08-15 VITALS — HEART RATE: 76 BPM | DIASTOLIC BLOOD PRESSURE: 77 MMHG | SYSTOLIC BLOOD PRESSURE: 124 MMHG

## 2018-08-15 DIAGNOSIS — O09.522 ELDERLY MULTIGRAVIDA IN SECOND TRIMESTER: ICD-10-CM

## 2018-08-15 DIAGNOSIS — O20.9 VAGINAL BLEEDING AFFECTING EARLY PREGNANCY: ICD-10-CM

## 2018-08-15 DIAGNOSIS — O44.20 MARGINAL PLACENTA PREVIA: Primary | ICD-10-CM

## 2018-08-15 LAB
GENITAL VAGINOSIS SCREEN: NEGATIVE
TRICHOMONAS SCREEN: NEGATIVE

## 2018-08-15 PROCEDURE — 99214 OFFICE O/P EST MOD 30 MIN: CPT | Performed by: OBSTETRICS & GYNECOLOGY

## 2018-08-15 PROCEDURE — 76815 OB US LIMITED FETUS(S): CPT | Performed by: OBSTETRICS & GYNECOLOGY

## 2018-08-15 NOTE — PROGRESS NOTES
Outpatient Maternal-Fetal Medicine Consultation    Dear Ms. Frazier,    Thank you for requesting ultrasound evaluation and maternal fetal medicine consultation on your patient Rohit Gil.   As you are aware she is a 30year old female with a [place] associated with elevated risks. Specifically, there is a higher rate of:  · Fetal malformations  · Preeclampsia  · Gestational diabetes  · Intrauterine fetal death        We discussed the recommended plan of care based on her  risk factors.   Ysabel

## 2018-09-11 ENCOUNTER — TELEPHONE (OUTPATIENT)
Dept: OBGYN CLINIC | Facility: CLINIC | Age: 38
End: 2018-09-11

## 2018-09-12 ENCOUNTER — ROUTINE PRENATAL (OUTPATIENT)
Dept: OBGYN CLINIC | Facility: CLINIC | Age: 38
End: 2018-09-12
Payer: COMMERCIAL

## 2018-09-12 VITALS
WEIGHT: 136.5 LBS | BODY MASS INDEX: 21.94 KG/M2 | SYSTOLIC BLOOD PRESSURE: 96 MMHG | DIASTOLIC BLOOD PRESSURE: 62 MMHG | HEART RATE: 60 BPM | HEIGHT: 66 IN

## 2018-09-12 DIAGNOSIS — Z34.82 PRENATAL CARE, SUBSEQUENT PREGNANCY, SECOND TRIMESTER: Primary | ICD-10-CM

## 2018-09-12 DIAGNOSIS — O09.522 ELDERLY MULTIGRAVIDA IN SECOND TRIMESTER: ICD-10-CM

## 2018-09-12 PROBLEM — O44.20 MARGINAL PLACENTA PREVIA (HCC): Status: RESOLVED | Noted: 2018-08-13 | Resolved: 2018-09-12

## 2018-09-12 PROBLEM — O41.8X20 SUBCHORIONIC HEMATOMA IN SECOND TRIMESTER (HCC): Status: ACTIVE | Noted: 2018-09-12

## 2018-09-12 PROBLEM — O44.20 MARGINAL PLACENTA PREVIA: Status: RESOLVED | Noted: 2018-08-13 | Resolved: 2018-09-12

## 2018-09-12 PROBLEM — O46.8X2 SUBCHORIONIC HEMATOMA IN SECOND TRIMESTER: Status: ACTIVE | Noted: 2018-09-12

## 2018-09-12 PROBLEM — O03.9 SPONTANEOUS MISCARRIAGE (HCC): Status: RESOLVED | Noted: 2017-09-23 | Resolved: 2018-09-12

## 2018-09-12 PROBLEM — O41.8X20 SUBCHORIONIC HEMATOMA IN SECOND TRIMESTER: Status: ACTIVE | Noted: 2018-09-12

## 2018-09-12 PROBLEM — O03.9 SPONTANEOUS MISCARRIAGE: Status: RESOLVED | Noted: 2017-09-23 | Resolved: 2018-09-12

## 2018-09-12 PROBLEM — O46.8X2 SUBCHORIONIC HEMATOMA IN SECOND TRIMESTER (HCC): Status: ACTIVE | Noted: 2018-09-12

## 2018-09-12 PROCEDURE — 81002 URINALYSIS NONAUTO W/O SCOPE: CPT | Performed by: ADVANCED PRACTICE MIDWIFE

## 2018-09-12 NOTE — PROGRESS NOTES
U/S reviewed with pt. No vaginal bleeding. Pt has mild L hip pain mainly when goes up stairs or standing for long periods. Pt to see chrio.   Pt on limited activity due to a subchorionic hemorrhage is noted measuring 3.3 cm x 1.1 cm x 4.8 cm in the 2nd blee

## 2018-09-20 NOTE — PROGRESS NOTES
Outpatient Maternal-Fetal Medicine Follow-Up     Dear Ms. Frazier,     Thank you for requesting ultrasound evaluation and maternal fetal medicine consultation on your patient Umberto Sensing.   As you are aware she is a 29/36 year old female  with a Si discussed her option for amniocentesis but that the likelihood of finding a genetic concern is quite low after her other low risk evaluations. She appropriately declined invasive prenatal genetic diagnostic testing.     Subchorionic bleed -   resolved

## 2018-09-21 ENCOUNTER — HOSPITAL ENCOUNTER (OUTPATIENT)
Dept: PERINATAL CARE | Facility: HOSPITAL | Age: 38
Discharge: HOME | End: 2018-09-21
Attending: OBSTETRICS & GYNECOLOGY
Payer: COMMERCIAL

## 2018-09-21 VITALS — HEART RATE: 77 BPM | DIASTOLIC BLOOD PRESSURE: 65 MMHG | SYSTOLIC BLOOD PRESSURE: 110 MMHG

## 2018-09-21 DIAGNOSIS — O46.8X2 SUBCHORIONIC HEMATOMA IN SECOND TRIMESTER, SINGLE OR UNSPECIFIED FETUS: ICD-10-CM

## 2018-09-21 DIAGNOSIS — O41.8X20 SUBCHORIONIC HEMATOMA IN SECOND TRIMESTER, SINGLE OR UNSPECIFIED FETUS: ICD-10-CM

## 2018-09-21 DIAGNOSIS — O99.810 GLUCOSE INTOLERANCE OF PREGNANCY: ICD-10-CM

## 2018-09-21 DIAGNOSIS — O09.522 AMA (ADVANCED MATERNAL AGE) MULTIGRAVIDA 35+, SECOND TRIMESTER: Primary | ICD-10-CM

## 2018-09-21 DIAGNOSIS — O09.522 AMA (ADVANCED MATERNAL AGE) MULTIGRAVIDA 35+, SECOND TRIMESTER: ICD-10-CM

## 2018-09-21 PROCEDURE — 76811 OB US DETAILED SNGL FETUS: CPT | Performed by: OBSTETRICS & GYNECOLOGY

## 2018-09-21 PROCEDURE — 99215 OFFICE O/P EST HI 40 MIN: CPT | Performed by: OBSTETRICS & GYNECOLOGY

## 2018-09-21 NOTE — PROGRESS NOTES
Pt for level 2 US  HX ama low risk harmony male fetus  Denies pregnancy complaints  States active fetus

## 2018-10-11 ENCOUNTER — ROUTINE PRENATAL (OUTPATIENT)
Dept: OBGYN CLINIC | Facility: CLINIC | Age: 38
End: 2018-10-11
Payer: COMMERCIAL

## 2018-10-11 VITALS
WEIGHT: 140 LBS | SYSTOLIC BLOOD PRESSURE: 104 MMHG | BODY MASS INDEX: 23 KG/M2 | DIASTOLIC BLOOD PRESSURE: 69 MMHG | HEART RATE: 62 BPM

## 2018-10-11 DIAGNOSIS — Z34.82 ENCOUNTER FOR SUPERVISION OF OTHER NORMAL PREGNANCY IN SECOND TRIMESTER: Primary | ICD-10-CM

## 2018-10-11 DIAGNOSIS — Z23 FLU VACCINE NEED: ICD-10-CM

## 2018-10-11 PROBLEM — O20.9 VAGINAL BLEEDING AFFECTING EARLY PREGNANCY (HCC): Status: RESOLVED | Noted: 2018-06-20 | Resolved: 2018-10-11

## 2018-10-11 PROBLEM — O20.9 VAGINAL BLEEDING AFFECTING EARLY PREGNANCY: Status: RESOLVED | Noted: 2018-06-20 | Resolved: 2018-10-11

## 2018-10-11 PROCEDURE — 81002 URINALYSIS NONAUTO W/O SCOPE: CPT | Performed by: ADVANCED PRACTICE MIDWIFE

## 2018-10-11 PROCEDURE — 90686 IIV4 VACC NO PRSV 0.5 ML IM: CPT | Performed by: ADVANCED PRACTICE MIDWIFE

## 2018-10-11 PROCEDURE — 90471 IMMUNIZATION ADMIN: CPT | Performed by: ADVANCED PRACTICE MIDWIFE

## 2018-10-11 NOTE — PROGRESS NOTES
Feeling well, denies danger signs. Endorses fetal movement. REv 20 week ultrasound. Patient is still nervous regarding the subchorionic hemorrhage and 1st tri bleeding. Was told at that time to avoid intercourse and physical activity.   Reassured her that h

## 2018-10-30 ENCOUNTER — LAB ENCOUNTER (OUTPATIENT)
Dept: LAB | Facility: HOSPITAL | Age: 38
End: 2018-10-30
Attending: ADVANCED PRACTICE MIDWIFE
Payer: COMMERCIAL

## 2018-10-30 DIAGNOSIS — Z34.82 ENCOUNTER FOR SUPERVISION OF OTHER NORMAL PREGNANCY IN SECOND TRIMESTER: ICD-10-CM

## 2018-10-30 PROCEDURE — 85027 COMPLETE CBC AUTOMATED: CPT

## 2018-10-30 PROCEDURE — 82951 GLUCOSE TOLERANCE TEST (GTT): CPT

## 2018-10-30 PROCEDURE — 86780 TREPONEMA PALLIDUM: CPT

## 2018-10-30 PROCEDURE — 36415 COLL VENOUS BLD VENIPUNCTURE: CPT

## 2018-10-30 PROCEDURE — 87389 HIV-1 AG W/HIV-1&-2 AB AG IA: CPT

## 2018-11-06 ENCOUNTER — ROUTINE PRENATAL (OUTPATIENT)
Dept: OBGYN CLINIC | Facility: CLINIC | Age: 38
End: 2018-11-06
Payer: COMMERCIAL

## 2018-11-06 VITALS
WEIGHT: 144 LBS | DIASTOLIC BLOOD PRESSURE: 74 MMHG | HEART RATE: 73 BPM | BODY MASS INDEX: 23 KG/M2 | SYSTOLIC BLOOD PRESSURE: 111 MMHG

## 2018-11-06 DIAGNOSIS — Z34.82 ENCOUNTER FOR SUPERVISION OF OTHER NORMAL PREGNANCY IN SECOND TRIMESTER: Primary | ICD-10-CM

## 2018-11-06 PROCEDURE — 81002 URINALYSIS NONAUTO W/O SCOPE: CPT | Performed by: ADVANCED PRACTICE MIDWIFE

## 2018-11-06 NOTE — PROGRESS NOTES
Baby active. No signs PTL. No further bleeding. Expresses feeling worried this pregnancy because of the early bleeding. Reassured that subchorionic hemorrhage has resolved and therefore should not have further bleeding.  Also worried as last labor very quic

## 2018-11-20 ENCOUNTER — APPOINTMENT (OUTPATIENT)
Dept: LAB | Facility: HOSPITAL | Age: 38
End: 2018-11-20
Attending: ADVANCED PRACTICE MIDWIFE
Payer: COMMERCIAL

## 2018-11-20 ENCOUNTER — ROUTINE PRENATAL (OUTPATIENT)
Dept: OBGYN CLINIC | Facility: CLINIC | Age: 38
End: 2018-11-20
Payer: COMMERCIAL

## 2018-11-20 VITALS
WEIGHT: 146 LBS | HEART RATE: 73 BPM | HEIGHT: 66 IN | DIASTOLIC BLOOD PRESSURE: 63 MMHG | SYSTOLIC BLOOD PRESSURE: 93 MMHG | BODY MASS INDEX: 23.46 KG/M2

## 2018-11-20 DIAGNOSIS — Z34.82 ENCOUNTER FOR SUPERVISION OF OTHER NORMAL PREGNANCY IN SECOND TRIMESTER: ICD-10-CM

## 2018-11-20 DIAGNOSIS — Z34.83 PRENATAL CARE, SUBSEQUENT PREGNANCY, THIRD TRIMESTER: Primary | ICD-10-CM

## 2018-11-20 PROCEDURE — 36415 COLL VENOUS BLD VENIPUNCTURE: CPT

## 2018-11-20 PROCEDURE — 81002 URINALYSIS NONAUTO W/O SCOPE: CPT | Performed by: ADVANCED PRACTICE MIDWIFE

## 2018-11-20 PROCEDURE — 87389 HIV-1 AG W/HIV-1&-2 AB AG IA: CPT

## 2018-11-20 PROCEDURE — 86780 TREPONEMA PALLIDUM: CPT

## 2018-11-20 PROCEDURE — 90471 IMMUNIZATION ADMIN: CPT | Performed by: ADVANCED PRACTICE MIDWIFE

## 2018-11-20 PROCEDURE — 90715 TDAP VACCINE 7 YRS/> IM: CPT | Performed by: ADVANCED PRACTICE MIDWIFE

## 2018-11-20 NOTE — PROGRESS NOTES
Baby very active all the time. Discomfort over pubic bone. Discussed comfort measures. Will go today for HIV & Trep. Tdap today. Trying to figure out childcare for when goes into labor. Their parents are all older and will not come to their house.  Discusse

## 2018-11-26 ENCOUNTER — TELEPHONE (OUTPATIENT)
Dept: OBGYN CLINIC | Facility: CLINIC | Age: 38
End: 2018-11-26

## 2018-11-26 NOTE — TELEPHONE ENCOUNTER
Spoke with pt and advised it is recommended for pt's  to receive TDAP vaccine. Pt agreed and voiced understanding.

## 2018-12-06 ENCOUNTER — ROUTINE PRENATAL (OUTPATIENT)
Dept: OBGYN CLINIC | Facility: CLINIC | Age: 38
End: 2018-12-06
Payer: COMMERCIAL

## 2018-12-06 VITALS
HEART RATE: 71 BPM | BODY MASS INDEX: 24 KG/M2 | DIASTOLIC BLOOD PRESSURE: 73 MMHG | SYSTOLIC BLOOD PRESSURE: 103 MMHG | WEIGHT: 148 LBS

## 2018-12-06 DIAGNOSIS — Z34.83 ENCOUNTER FOR SUPERVISION OF OTHER NORMAL PREGNANCY IN THIRD TRIMESTER: Primary | ICD-10-CM

## 2018-12-06 PROCEDURE — 81002 URINALYSIS NONAUTO W/O SCOPE: CPT | Performed by: ADVANCED PRACTICE MIDWIFE

## 2018-12-14 NOTE — PROGRESS NOTES
Outpatient Maternal-Fetal Medicine Follow-Up     Dear Ms. Frazier,     Thank you for requesting ultrasound evaluation and maternal fetal medicine consultation on your patient Addison García.  As you are aware she is a 29/36 year old female  with a Si growth abnormalities, hypertensive complications, maternal hospitalizations,  mortality and  delivery. We discussed the plan of care and the rationale for the increased surveillance.   We reviewed the signs and symptoms of preeclampsia, pr

## 2018-12-18 ENCOUNTER — ROUTINE PRENATAL (OUTPATIENT)
Dept: OBGYN CLINIC | Facility: CLINIC | Age: 38
End: 2018-12-18
Payer: COMMERCIAL

## 2018-12-18 VITALS
WEIGHT: 148.5 LBS | DIASTOLIC BLOOD PRESSURE: 71 MMHG | BODY MASS INDEX: 24 KG/M2 | SYSTOLIC BLOOD PRESSURE: 108 MMHG | HEART RATE: 63 BPM

## 2018-12-18 DIAGNOSIS — Z34.83 PRENATAL CARE, SUBSEQUENT PREGNANCY, THIRD TRIMESTER: Primary | ICD-10-CM

## 2018-12-18 PROCEDURE — 81002 URINALYSIS NONAUTO W/O SCOPE: CPT | Performed by: ADVANCED PRACTICE MIDWIFE

## 2018-12-18 NOTE — PROGRESS NOTES
Baby very active. Getting more uncomfortable. No signs PTL. Has growth u/s on Thursday. Reports some leakage of urine. Kegals reviewed. Consider pelvic floor PT PP if continues PP.

## 2018-12-20 ENCOUNTER — HOSPITAL ENCOUNTER (OUTPATIENT)
Dept: PERINATAL CARE | Facility: HOSPITAL | Age: 38
Discharge: HOME | End: 2018-12-20
Attending: OBSTETRICS & GYNECOLOGY
Payer: COMMERCIAL

## 2018-12-20 VITALS
DIASTOLIC BLOOD PRESSURE: 69 MMHG | HEART RATE: 96 BPM | SYSTOLIC BLOOD PRESSURE: 110 MMHG | HEIGHT: 66 IN | WEIGHT: 148 LBS | BODY MASS INDEX: 23.78 KG/M2

## 2018-12-20 DIAGNOSIS — O09.523 MULTIGRAVIDA OF ADVANCED MATERNAL AGE IN THIRD TRIMESTER: Primary | ICD-10-CM

## 2018-12-20 DIAGNOSIS — O46.8X2 SUBCHORIONIC HEMATOMA IN SECOND TRIMESTER, SINGLE OR UNSPECIFIED FETUS: ICD-10-CM

## 2018-12-20 DIAGNOSIS — O09.523 MULTIGRAVIDA OF ADVANCED MATERNAL AGE IN THIRD TRIMESTER: ICD-10-CM

## 2018-12-20 DIAGNOSIS — O41.8X20 SUBCHORIONIC HEMATOMA IN SECOND TRIMESTER, SINGLE OR UNSPECIFIED FETUS: ICD-10-CM

## 2018-12-20 PROCEDURE — 99214 OFFICE O/P EST MOD 30 MIN: CPT | Performed by: OBSTETRICS & GYNECOLOGY

## 2018-12-20 PROCEDURE — 76805 OB US >/= 14 WKS SNGL FETUS: CPT | Performed by: OBSTETRICS & GYNECOLOGY

## 2018-12-20 PROCEDURE — 76819 FETAL BIOPHYS PROFIL W/O NST: CPT | Performed by: OBSTETRICS & GYNECOLOGY

## 2018-12-20 NOTE — ADDENDUM NOTE
Encounter addended by: Cristina Ding on: 12/20/2018 11:59 AM   Actions taken: Charge Capture section accepted

## 2019-01-04 ENCOUNTER — ROUTINE PRENATAL (OUTPATIENT)
Dept: OBGYN CLINIC | Facility: CLINIC | Age: 39
End: 2019-01-04
Payer: COMMERCIAL

## 2019-01-04 VITALS
BODY MASS INDEX: 24 KG/M2 | SYSTOLIC BLOOD PRESSURE: 120 MMHG | HEART RATE: 63 BPM | DIASTOLIC BLOOD PRESSURE: 83 MMHG | WEIGHT: 151.19 LBS

## 2019-01-04 DIAGNOSIS — Z34.83 ENCOUNTER FOR SUPERVISION OF OTHER NORMAL PREGNANCY IN THIRD TRIMESTER: Primary | ICD-10-CM

## 2019-01-04 LAB
APPEARANCE: CLEAR
MULTISTIX LOT#: NORMAL NUMERIC
PH, URINE: 8 (ref 4.5–8)
SPECIFIC GRAVITY: 1.01 (ref 1–1.03)
URINE-COLOR: YELLOW
UROBILINOGEN,SEMI-QN: 0.2 MG/DL (ref 0–1.9)

## 2019-01-04 PROCEDURE — 81002 URINALYSIS NONAUTO W/O SCOPE: CPT | Performed by: ADVANCED PRACTICE MIDWIFE

## 2019-01-04 RX ORDER — BREAST PUMP
EACH MISCELLANEOUS
Qty: 1 EACH | Refills: 0 | Status: SHIPPED | OUTPATIENT
Start: 2019-01-04 | End: 2021-05-27

## 2019-01-04 NOTE — PROGRESS NOTES
Baby active. Having more ambika urbina, other night had then for 5 hrs q 2-10 min day after xmas. No contractions currently. Cervix 1.5/50/-2, posterior. GBS today. Warning signs reviewed.  If having regular contractions that do not resolve with rest and hy

## 2019-01-08 PROBLEM — O98.819 GROUP B STREPTOCOCCAL INFECTION DURING PREGNANCY: Status: ACTIVE | Noted: 2019-01-08

## 2019-01-08 PROBLEM — B95.1 GROUP B STREPTOCOCCAL INFECTION DURING PREGNANCY (HCC): Status: ACTIVE | Noted: 2019-01-08

## 2019-01-08 PROBLEM — B95.1 GROUP B STREPTOCOCCAL INFECTION DURING PREGNANCY: Status: ACTIVE | Noted: 2019-01-08

## 2019-01-08 PROBLEM — O98.819 GROUP B STREPTOCOCCAL INFECTION DURING PREGNANCY (HCC): Status: ACTIVE | Noted: 2019-01-08

## 2019-01-09 ENCOUNTER — HOSPITAL ENCOUNTER (OUTPATIENT)
Facility: HOSPITAL | Age: 39
Setting detail: OBSERVATION
Discharge: HOME | End: 2019-01-09
Attending: ADVANCED PRACTICE MIDWIFE | Admitting: OBSTETRICS & GYNECOLOGY
Payer: COMMERCIAL

## 2019-01-09 ENCOUNTER — TELEPHONE (OUTPATIENT)
Dept: OBGYN CLINIC | Facility: CLINIC | Age: 39
End: 2019-01-09

## 2019-01-09 VITALS
TEMPERATURE: 98 F | HEIGHT: 65.98 IN | WEIGHT: 151 LBS | DIASTOLIC BLOOD PRESSURE: 69 MMHG | SYSTOLIC BLOOD PRESSURE: 117 MMHG | HEART RATE: 68 BPM | BODY MASS INDEX: 24.27 KG/M2

## 2019-01-09 PROBLEM — Z34.90 PREGNANCY: Status: ACTIVE | Noted: 2019-01-09

## 2019-01-09 PROBLEM — Z34.90 PREGNANCY (HCC): Status: ACTIVE | Noted: 2019-01-09

## 2019-01-09 LAB
APTT PPP: 28.1 SECONDS (ref 23.2–35.3)
BASOPHILS # BLD: 0 K/UL (ref 0–0.2)
BASOPHILS NFR BLD: 0 %
EOSINOPHIL # BLD: 0 K/UL (ref 0–0.7)
EOSINOPHIL NFR BLD: 1 %
ERYTHROCYTE [DISTWIDTH] IN BLOOD BY AUTOMATED COUNT: 12.6 % (ref 11–15)
FIBRINOGEN PPP-MCNC: 496 MG/DL (ref 176–491)
HCT VFR BLD AUTO: 35.7 % (ref 35–48)
HGB BLD-MCNC: 12.3 G/DL (ref 12–16)
HGB F MFR BLD KLEIH BETKE: <0.1 %
INR BLD: 1 (ref 0.9–1.2)
LYMPHOCYTES # BLD: 1.4 K/UL (ref 1–4)
LYMPHOCYTES NFR BLD: 16 %
MCH RBC QN AUTO: 32.7 PG (ref 27–32)
MCHC RBC AUTO-ENTMCNC: 34.4 G/DL (ref 32–37)
MCV RBC AUTO: 95 FL (ref 80–100)
MONOCYTES # BLD: 0.7 K/UL (ref 0–1)
MONOCYTES NFR BLD: 9 %
NEUTROPHILS # BLD AUTO: 6.3 K/UL (ref 1.8–7.7)
NEUTROPHILS NFR BLD: 74 %
PLATELET # BLD AUTO: 139 K/UL (ref 140–400)
PMV BLD AUTO: 8.2 FL (ref 7.4–10.3)
PROTHROMBIN TIME: 12.7 SECONDS (ref 11.8–14.5)
RBC # BLD AUTO: 3.76 M/UL (ref 3.7–5.4)
WBC # BLD AUTO: 8.4 K/UL (ref 4–11)

## 2019-01-09 PROCEDURE — 59025 FETAL NON-STRESS TEST: CPT | Performed by: ADVANCED PRACTICE MIDWIFE

## 2019-01-09 NOTE — TRIAGE
[facility]      Triage Note    Prabha Nguyen Patient Status:  Observation    10/16/1980 MRN [id number]   Location [address] [facility] Attending Irlanda Armijo, [address] [facility] Day # 0 [facility] Accelerations: Yes           Decelerations: None            Baseline: 125 BPM           Uterine Irritability: No           Contractions: Not present                       Acoustic Stimulator: No           Nonstress Test Interpretation: Reactive           N

## 2019-01-09 NOTE — PROGRESS NOTES
Pt instructed to keep her OB appointment tomorrow in the office and to call in the mean time with any cramping, pain, or bleeding.  She does have some mild musculoskeletal soreness from falling and she is advised to continue to monitor for symptom changes a

## 2019-01-09 NOTE — TRIAGE
[facility]      Triage Note    Anabell Srinivasan Patient Status:  Observation    10/16/1980 MRN [id number]   Location [address] [facility] Attending Jocelynn Evangelista, [address] [facility] Day # 0 [facility] cramping, or bleeding at this time. Pt placed on monitors. Call light within reach.       Park Horvath RN  1/9/2019 10:22 AM

## 2019-01-09 NOTE — TELEPHONE ENCOUNTER
Pt states she fell down 1/2 a flight of stairs. Landed on her bottom. Never hit her belly. +fm. Denies vag bleeding. +mucusy vag discharge, unchanged from the usual. Advised pt she should go to [facility] for monitoring & anticipate staying for awhile.  Pt states

## 2019-01-10 ENCOUNTER — ROUTINE PRENATAL (OUTPATIENT)
Dept: OBGYN CLINIC | Facility: CLINIC | Age: 39
End: 2019-01-10
Payer: COMMERCIAL

## 2019-01-10 VITALS
SYSTOLIC BLOOD PRESSURE: 121 MMHG | WEIGHT: 152.81 LBS | DIASTOLIC BLOOD PRESSURE: 78 MMHG | HEART RATE: 92 BPM | BODY MASS INDEX: 25 KG/M2

## 2019-01-10 DIAGNOSIS — Z34.83 ENCOUNTER FOR SUPERVISION OF OTHER NORMAL PREGNANCY IN THIRD TRIMESTER: Primary | ICD-10-CM

## 2019-01-10 PROCEDURE — 81002 URINALYSIS NONAUTO W/O SCOPE: CPT | Performed by: ADVANCED PRACTICE MIDWIFE

## 2019-01-10 RX ORDER — BREAST PUMP
EACH MISCELLANEOUS
Qty: 1 EACH | Refills: 0 | Status: SHIPPED | OUTPATIENT
Start: 2019-01-10 | End: 2021-05-27

## 2019-01-10 RX ORDER — BREAST PUMP
EACH MISCELLANEOUS
Qty: 1 EACH | Refills: 0 | OUTPATIENT
Start: 2019-01-10 | End: 2019-01-10

## 2019-01-11 NOTE — PROGRESS NOTES
Active fetus Increasing BH contractions. Denies any leaking of fluid or bleeding. Pt feel yesterday and was evaluated in triage  Remains with some back pain from fall. Reviewed warning signs of abruption. Pt states understanding.  Pt is concerned about h

## 2019-01-17 ENCOUNTER — ROUTINE PRENATAL (OUTPATIENT)
Dept: OBGYN CLINIC | Facility: CLINIC | Age: 39
End: 2019-01-17
Payer: COMMERCIAL

## 2019-01-17 ENCOUNTER — APPOINTMENT (OUTPATIENT)
Dept: LAB | Facility: HOSPITAL | Age: 39
End: 2019-01-17
Attending: ADVANCED PRACTICE MIDWIFE
Payer: COMMERCIAL

## 2019-01-17 VITALS
WEIGHT: 154 LBS | HEART RATE: 71 BPM | DIASTOLIC BLOOD PRESSURE: 76 MMHG | BODY MASS INDEX: 25 KG/M2 | SYSTOLIC BLOOD PRESSURE: 113 MMHG

## 2019-01-17 DIAGNOSIS — D69.6 TEMPORARY LOW PLATELET COUNT (HCC): Primary | ICD-10-CM

## 2019-01-17 DIAGNOSIS — Z34.83 ENCOUNTER FOR SUPERVISION OF OTHER NORMAL PREGNANCY IN THIRD TRIMESTER: ICD-10-CM

## 2019-01-17 DIAGNOSIS — D69.6 TEMPORARY LOW PLATELET COUNT (HCC): ICD-10-CM

## 2019-01-17 LAB
APPEARANCE: CLEAR
ERYTHROCYTE [DISTWIDTH] IN BLOOD BY AUTOMATED COUNT: 12.2 % (ref 11–15)
HCT VFR BLD AUTO: 37.9 % (ref 35–48)
HGB BLD-MCNC: 13 G/DL (ref 12–16)
MCH RBC QN AUTO: 32.6 PG (ref 27–32)
MCHC RBC AUTO-ENTMCNC: 34.4 G/DL (ref 32–37)
MCV RBC AUTO: 94.7 FL (ref 80–100)
MULTISTIX LOT#: NORMAL NUMERIC
PH, URINE: 5 (ref 4.5–8)
PLATELET # BLD AUTO: 134 K/UL (ref 140–400)
PMV BLD AUTO: 8.9 FL (ref 7.4–10.3)
RBC # BLD AUTO: 4 M/UL (ref 3.7–5.4)
SPECIFIC GRAVITY: 1 (ref 1–1.03)
URINE-COLOR: YELLOW
UROBILINOGEN,SEMI-QN: 0 MG/DL (ref 0–1.9)
WBC # BLD AUTO: 9.2 K/UL (ref 4–11)

## 2019-01-17 PROCEDURE — 81002 URINALYSIS NONAUTO W/O SCOPE: CPT | Performed by: ADVANCED PRACTICE MIDWIFE

## 2019-01-17 PROCEDURE — 85027 COMPLETE CBC AUTOMATED: CPT

## 2019-01-17 PROCEDURE — 36415 COLL VENOUS BLD VENIPUNCTURE: CPT

## 2019-01-17 NOTE — PROGRESS NOTES
Active fetus Increasing BH contractions. Denies any leaking of fluid or bleeding. Repeat CBC - plt were 139. Cervix posterior. Reviewed S&S labor  Warning signs reviewed  All questions answered.

## 2019-01-18 ENCOUNTER — TELEPHONE (OUTPATIENT)
Dept: OBGYN CLINIC | Facility: CLINIC | Age: 39
End: 2019-01-18

## 2019-01-19 NOTE — TELEPHONE ENCOUNTER
Tc from patient reporting some mucous today. Concerned this could be a sign of imminent labor because she was 2cm in office yesterday.   Also worried that she won't know when she is in labor because she doesn't remember feeling much with first labor - was

## 2019-01-23 ENCOUNTER — TELEPHONE (OUTPATIENT)
Dept: OBGYN CLINIC | Facility: CLINIC | Age: 39
End: 2019-01-23

## 2019-01-23 DIAGNOSIS — Z3A.37 37 WEEKS GESTATION OF PREGNANCY: Primary | ICD-10-CM

## 2019-01-23 NOTE — TELEPHONE ENCOUNTER
Spoke with pt advised of lab results and MES rec's. Lab orders placed. Pt agreed and voiced understanding.

## 2019-01-23 NOTE — TELEPHONE ENCOUNTER
----- Message from Zaki Chow CNM sent at 1/23/2019 12:21 PM CST -----  Please call this pt her platelets are still low will repeat weekly until delivered.   Order weekly CBC and a one time Comp14  Thank you

## 2019-01-24 ENCOUNTER — ROUTINE PRENATAL (OUTPATIENT)
Dept: OBGYN CLINIC | Facility: CLINIC | Age: 39
End: 2019-01-24
Payer: COMMERCIAL

## 2019-01-24 ENCOUNTER — APPOINTMENT (OUTPATIENT)
Dept: LAB | Facility: HOSPITAL | Age: 39
End: 2019-01-24
Attending: ADVANCED PRACTICE MIDWIFE
Payer: COMMERCIAL

## 2019-01-24 VITALS
DIASTOLIC BLOOD PRESSURE: 83 MMHG | BODY MASS INDEX: 25 KG/M2 | WEIGHT: 155 LBS | SYSTOLIC BLOOD PRESSURE: 119 MMHG | HEART RATE: 73 BPM

## 2019-01-24 DIAGNOSIS — Z34.83 ENCOUNTER FOR SUPERVISION OF OTHER NORMAL PREGNANCY IN THIRD TRIMESTER: Primary | ICD-10-CM

## 2019-01-24 DIAGNOSIS — Z3A.37 37 WEEKS GESTATION OF PREGNANCY: ICD-10-CM

## 2019-01-24 PROBLEM — D69.6 THROMBOCYTOPENIA AFFECTING PREGNANCY (HCC): Status: ACTIVE | Noted: 2019-01-24

## 2019-01-24 PROBLEM — O99.119 THROMBOCYTOPENIA AFFECTING PREGNANCY (HCC): Status: ACTIVE | Noted: 2019-01-24

## 2019-01-24 LAB
ALBUMIN SERPL BCP-MCNC: 3.3 G/DL (ref 3.5–4.8)
ALBUMIN/GLOB SERPL: 1.1 {RATIO} (ref 1–2)
ALP SERPL-CCNC: 121 U/L (ref 32–100)
ALT SERPL-CCNC: 14 U/L (ref 14–54)
ANION GAP SERPL CALC-SCNC: 10 MMOL/L (ref 0–18)
APPEARANCE: CLEAR
AST SERPL-CCNC: 23 U/L (ref 15–41)
BILIRUB SERPL-MCNC: 0.7 MG/DL (ref 0.3–1.2)
BUN SERPL-MCNC: 9 MG/DL (ref 8–20)
BUN/CREAT SERPL: 11.4 (ref 10–20)
CALCIUM SERPL-MCNC: 8.8 MG/DL (ref 8.5–10.5)
CHLORIDE SERPL-SCNC: 104 MMOL/L (ref 95–110)
CO2 SERPL-SCNC: 21 MMOL/L (ref 22–32)
CREAT SERPL-MCNC: 0.79 MG/DL (ref 0.5–1.5)
ERYTHROCYTE [DISTWIDTH] IN BLOOD BY AUTOMATED COUNT: 12.6 % (ref 11–15)
GLOBULIN PLAS-MCNC: 2.9 G/DL (ref 2.5–3.7)
GLUCOSE SERPL-MCNC: 71 MG/DL (ref 70–99)
HCT VFR BLD AUTO: 39 % (ref 35–48)
HGB BLD-MCNC: 13.4 G/DL (ref 12–16)
MCH RBC QN AUTO: 32.8 PG (ref 27–32)
MCHC RBC AUTO-ENTMCNC: 34.5 G/DL (ref 32–37)
MCV RBC AUTO: 95.1 FL (ref 80–100)
MULTISTIX LOT#: NORMAL NUMERIC
OSMOLALITY UR CALC.SUM OF ELEC: 277 MOSM/KG (ref 275–295)
PATIENT FASTING: NO
PH, URINE: 5 (ref 4.5–8)
PLATELET # BLD AUTO: 131 K/UL (ref 140–400)
PMV BLD AUTO: 8.8 FL (ref 7.4–10.3)
POTASSIUM SERPL-SCNC: 3.7 MMOL/L (ref 3.3–5.1)
PROT SERPL-MCNC: 6.2 G/DL (ref 5.9–8.4)
RBC # BLD AUTO: 4.1 M/UL (ref 3.7–5.4)
SODIUM SERPL-SCNC: 135 MMOL/L (ref 136–144)
SPECIFIC GRAVITY: 1.01 (ref 1–1.03)
URINE-COLOR: YELLOW
UROBILINOGEN,SEMI-QN: 0 MG/DL (ref 0–1.9)
WBC # BLD AUTO: 9.8 K/UL (ref 4–11)

## 2019-01-24 PROCEDURE — 80053 COMPREHEN METABOLIC PANEL: CPT

## 2019-01-24 PROCEDURE — 36415 COLL VENOUS BLD VENIPUNCTURE: CPT

## 2019-01-24 PROCEDURE — 85027 COMPLETE CBC AUTOMATED: CPT

## 2019-01-24 PROCEDURE — 81002 URINALYSIS NONAUTO W/O SCOPE: CPT | Performed by: ADVANCED PRACTICE MIDWIFE

## 2019-01-24 NOTE — PROGRESS NOTES
Pt doing well. Feeling a bit more cramping today. SVE unchanged from last exam.  Rev birth events with pt and  again, and she does report being increasingly uncomfortable for at least 2-3 hours prior to birth of first baby.   Rev to call in early l

## 2019-01-27 ENCOUNTER — HOSPITAL ENCOUNTER (OUTPATIENT)
Facility: HOSPITAL | Age: 39
Setting detail: OBSERVATION
Discharge: HOME | End: 2019-01-28
Attending: ADVANCED PRACTICE MIDWIFE | Admitting: OBSTETRICS & GYNECOLOGY
Payer: COMMERCIAL

## 2019-01-28 PROCEDURE — 59025 FETAL NON-STRESS TEST: CPT | Performed by: ADVANCED PRACTICE MIDWIFE

## 2019-01-28 NOTE — PROGRESS NOTES
Dayron Sane at bedside with orders to DC home and F/U with scheduled appointment. Pt made no cervical change after ambulating for 2 hrs. Pt. Discharged to home per ambulatory in stable condition with written and verbal instructions. Patient verbalizes [employer]

## 2019-01-28 NOTE — PROGRESS NOTES
Pt is a 41year old female admitted to TR1/TR1-A. Patient presents with:  R/o Labor     Pt is  38w3d intra-uterine pregnancy. History obtained, consents signed. Oriented to room, staff, and plan of care. [place] PT.

## 2019-01-28 NOTE — TRIAGE
[facility]      Triage Note    Tristan López Patient Status:  Observation    10/16/1980 MRN [id number]   Location [address] [place] [address] Attending Conrad Almaguer, [address] [facility] Day # 0 PCP Cj Argueta Para: Nonstress Test Second Interpretation: Reactive          FHR Category: Category I           Additional Comments       Reason for visit: pt arrived to triage accompanied by SO with c/o contractions 10 mins apart.  Pt state that they are mild but was very nerv

## 2019-01-30 ENCOUNTER — HOSPITAL ENCOUNTER (INPATIENT)
Facility: HOSPITAL | Age: 39
LOS: 2 days | Discharge: HOME | End: 2019-02-01
Attending: ADVANCED PRACTICE MIDWIFE | Admitting: OBSTETRICS & GYNECOLOGY
Payer: COMMERCIAL

## 2019-01-30 PROBLEM — O42.90 AMNIOTIC FLUID LEAKING (HCC): Status: ACTIVE | Noted: 2019-01-30

## 2019-01-30 PROBLEM — O42.90 AMNIOTIC FLUID LEAKING: Status: ACTIVE | Noted: 2019-01-30

## 2019-01-30 LAB
ANTIBODY SCREEN: NEGATIVE
DEPRECATED RDW RBC AUTO: 40.6 FL (ref 35.1–46.3)
ERYTHROCYTE [DISTWIDTH] IN BLOOD BY AUTOMATED COUNT: 11.9 % (ref 11–15)
HCT VFR BLD AUTO: 36.7 % (ref 35–48)
HGB BLD-MCNC: 12.6 G/DL (ref 12–16)
MCH RBC QN AUTO: 31.9 PG (ref 26–34)
MCHC RBC AUTO-ENTMCNC: 34.3 G/DL (ref 31–37)
MCV RBC AUTO: 92.9 FL (ref 80–100)
PLATELET # BLD AUTO: 136 10(3)UL (ref 150–450)
RBC # BLD AUTO: 3.95 X10(6)UL (ref 3.8–5.3)
RH BLOOD TYPE: POSITIVE
WBC # BLD AUTO: 9.9 X10(3) UL (ref 4–11)

## 2019-01-30 PROCEDURE — 85027 COMPLETE CBC AUTOMATED: CPT | Performed by: ADVANCED PRACTICE MIDWIFE

## 2019-01-30 PROCEDURE — 86901 BLOOD TYPING SEROLOGIC RH(D): CPT | Performed by: ADVANCED PRACTICE MIDWIFE

## 2019-01-30 PROCEDURE — 99214 OFFICE O/P EST MOD 30 MIN: CPT

## 2019-01-30 PROCEDURE — 86850 RBC ANTIBODY SCREEN: CPT | Performed by: ADVANCED PRACTICE MIDWIFE

## 2019-01-30 PROCEDURE — 86900 BLOOD TYPING SEROLOGIC ABO: CPT | Performed by: ADVANCED PRACTICE MIDWIFE

## 2019-01-30 RX ORDER — SODIUM CHLORIDE, SODIUM LACTATE, POTASSIUM CHLORIDE, CALCIUM CHLORIDE 600; 310; 30; 20 MG/100ML; MG/100ML; MG/100ML; MG/100ML
INJECTION, SOLUTION INTRAVENOUS CONTINUOUS
Status: DISCONTINUED | OUTPATIENT
Start: 2019-01-30 | End: 2019-01-31 | Stop reason: HOSPADM

## 2019-01-30 RX ORDER — IBUPROFEN 600 MG/1
600 TABLET ORAL ONCE AS NEEDED
Status: DISCONTINUED | OUTPATIENT
Start: 2019-01-30 | End: 2019-01-31 | Stop reason: HOSPADM

## 2019-01-30 RX ORDER — SODIUM CHLORIDE, SODIUM LACTATE, POTASSIUM CHLORIDE, CALCIUM CHLORIDE 600; 310; 30; 20 MG/100ML; MG/100ML; MG/100ML; MG/100ML
INJECTION, SOLUTION INTRAVENOUS
Status: COMPLETED
Start: 2019-01-30 | End: 2019-01-30

## 2019-01-30 RX ORDER — TERBUTALINE SULFATE 1 MG/ML
0.25 INJECTION, SOLUTION SUBCUTANEOUS AS NEEDED
Status: DISCONTINUED | OUTPATIENT
Start: 2019-01-30 | End: 2019-01-31 | Stop reason: HOSPADM

## 2019-01-30 RX ORDER — AMMONIA INHALANTS 0.04 G/.3ML
0.3 INHALANT RESPIRATORY (INHALATION) AS NEEDED
Status: DISCONTINUED | OUTPATIENT
Start: 2019-01-30 | End: 2019-01-31 | Stop reason: HOSPADM

## 2019-01-30 RX ORDER — SODIUM CHLORIDE 0.9 % (FLUSH) 0.9 %
10 SYRINGE (ML) INJECTION AS NEEDED
Status: DISCONTINUED | OUTPATIENT
Start: 2019-01-30 | End: 2019-01-31 | Stop reason: HOSPADM

## 2019-01-30 RX ORDER — TRISODIUM CITRATE DIHYDRATE AND CITRIC ACID MONOHYDRATE 500; 334 MG/5ML; MG/5ML
30 SOLUTION ORAL AS NEEDED
Status: DISCONTINUED | OUTPATIENT
Start: 2019-01-30 | End: 2019-01-31 | Stop reason: HOSPADM

## 2019-01-30 RX ORDER — LIDOCAINE HYDROCHLORIDE 10 MG/ML
30 INJECTION, SOLUTION EPIDURAL; INFILTRATION; INTRACAUDAL; PERINEURAL ONCE
Status: DISCONTINUED | OUTPATIENT
Start: 2019-01-30 | End: 2019-01-31 | Stop reason: HOSPADM

## 2019-01-30 NOTE — H&P
[place] [facility]    History & Physical    Joselito Arvin Patient Status:  Inpatient    10/16/1980 MRN [id number]   Location [address] Attending Karey Vera, [address] Day # 0 PCP Yamila Mcguire     Date of A Spondylitis   • Other (Other) Maternal Grandmother         Ankylosing Spondyltis   • Dementia Father      Social History: Social History    Tobacco Use      Smoking status: Never Smoker      Smokeless tobacco: Never Used    Alcohol use:  Yes      Alcohol/we 01/30/2019    HCT 36.7 01/30/2019    .0 (L) 01/30/2019    CREATSERUM 0.79 01/24/2019    BUN 9 01/24/2019     (L) 01/24/2019    K 3.7 01/24/2019     01/24/2019    CO2 21 (L) 01/24/2019    GLU 71 01/24/2019    CA 8.8 01/24/2019    ALB 3.3

## 2019-01-30 NOTE — PROGRESS NOTES
Sterile speculum exam done. Moderate amount of clear fluid pooling in spec. Amniotest positive and ferning present. Oc Edouard informed of all findings and orders received. To be admitted.

## 2019-01-30 NOTE — PROGRESS NOTES
· Nitrous Oxide self-administration education provided by: Trinity Monreal CNM and Ama Valentin RN  · Provider instructed patient and support person on the use of nitrous oxide use for pain management and self-administration policy   · Consent obtained

## 2019-01-31 LAB
BASOPHILS # BLD AUTO: 0.04 X10(3) UL (ref 0–0.2)
BASOPHILS NFR BLD AUTO: 0.3 %
DEPRECATED RDW RBC AUTO: 40.8 FL (ref 35.1–46.3)
EOSINOPHIL # BLD AUTO: 0.03 X10(3) UL (ref 0–0.7)
EOSINOPHIL NFR BLD AUTO: 0.3 %
ERYTHROCYTE [DISTWIDTH] IN BLOOD BY AUTOMATED COUNT: 11.9 % (ref 11–15)
HCT VFR BLD AUTO: 31.9 % (ref 35–48)
HGB BLD-MCNC: 10.9 G/DL (ref 12–16)
IMM GRANULOCYTES # BLD AUTO: 0.07 X10(3) UL (ref 0–1)
IMM GRANULOCYTES NFR BLD: 0.6 %
LYMPHOCYTES # BLD AUTO: 1.83 X10(3) UL (ref 1–4)
LYMPHOCYTES NFR BLD AUTO: 15.4 %
MCH RBC QN AUTO: 32.1 PG (ref 26–34)
MCHC RBC AUTO-ENTMCNC: 34.2 G/DL (ref 31–37)
MCV RBC AUTO: 93.8 FL (ref 80–100)
MONOCYTES # BLD AUTO: 0.85 X10(3) UL (ref 0.1–1)
MONOCYTES NFR BLD AUTO: 7.1 %
NEUTROPHILS # BLD AUTO: 9.09 X10 (3) UL (ref 1.5–7.7)
NEUTROPHILS # BLD AUTO: 9.09 X10(3) UL (ref 1.5–7.7)
NEUTROPHILS NFR BLD AUTO: 76.3 %
PLATELET # BLD AUTO: 129 10(3)UL (ref 150–450)
RBC # BLD AUTO: 3.4 X10(6)UL (ref 3.8–5.3)
WBC # BLD AUTO: 11.9 X10(3) UL (ref 4–11)

## 2019-01-31 PROCEDURE — 85025 COMPLETE CBC W/AUTO DIFF WBC: CPT | Performed by: ADVANCED PRACTICE MIDWIFE

## 2019-01-31 RX ORDER — DOCUSATE SODIUM 100 MG/1
100 CAPSULE, LIQUID FILLED ORAL 2 TIMES DAILY
Status: DISCONTINUED | OUTPATIENT
Start: 2019-01-31 | End: 2019-02-01

## 2019-01-31 RX ORDER — AMMONIA INHALANTS 0.04 G/.3ML
0.3 INHALANT RESPIRATORY (INHALATION) AS NEEDED
Status: DISCONTINUED | OUTPATIENT
Start: 2019-01-31 | End: 2019-02-01

## 2019-01-31 RX ORDER — SIMETHICONE 80 MG
80 TABLET,CHEWABLE ORAL 3 TIMES DAILY PRN
Status: DISCONTINUED | OUTPATIENT
Start: 2019-01-31 | End: 2019-02-01

## 2019-01-31 RX ORDER — BISACODYL 10 MG
10 SUPPOSITORY, RECTAL RECTAL ONCE AS NEEDED
Status: DISCONTINUED | OUTPATIENT
Start: 2019-01-31 | End: 2019-02-01

## 2019-01-31 RX ORDER — IBUPROFEN 200 MG
400 TABLET ORAL EVERY 4 HOURS PRN
Status: DISCONTINUED | OUTPATIENT
Start: 2019-01-31 | End: 2019-02-01

## 2019-01-31 RX ORDER — IBUPROFEN 600 MG/1
600 TABLET ORAL EVERY 4 HOURS PRN
Status: DISCONTINUED | OUTPATIENT
Start: 2019-01-31 | End: 2019-02-01

## 2019-01-31 RX ORDER — PRENATAL VIT,CAL 76/IRON/FOLIC 29 MG-1 MG
1 TABLET ORAL DAILY
Status: DISCONTINUED | OUTPATIENT
Start: 2019-01-31 | End: 2019-02-01

## 2019-01-31 RX ORDER — DIAPER,BRIEF,INFANT-TODD,DISP
1 EACH MISCELLANEOUS EVERY 6 HOURS PRN
Status: DISCONTINUED | OUTPATIENT
Start: 2019-01-31 | End: 2019-02-01

## 2019-01-31 RX ORDER — IBUPROFEN 200 MG
200 TABLET ORAL EVERY 4 HOURS PRN
Status: DISCONTINUED | OUTPATIENT
Start: 2019-01-31 | End: 2019-02-01

## 2019-01-31 RX ORDER — ONDANSETRON 2 MG/ML
4 INJECTION INTRAMUSCULAR; INTRAVENOUS EVERY 6 HOURS PRN
Status: DISCONTINUED | OUTPATIENT
Start: 2019-01-31 | End: 2019-02-01

## 2019-01-31 NOTE — LACTATION NOTE
LACTATION NOTE - MOTHER      Evaluation Type: Inpatient    Problems identified  Problems identified: Knowledge deficit  Problems Identified Other: potential for insufficient breastmilk supply    Maternal history  Other/comment: history of ankylosing spondi understanding of information. Will follow PRN.

## 2019-01-31 NOTE — PROGRESS NOTES
Received patient into room 358 via [facility]. Bedside shift report received from Marcelo. Pt transferred to bed. Bed in lowest and locked position. Side rails up x2. VSS. IV site unremarkable.  Baby present in open crib.  ID bands matched. Treva Bardales and family or

## 2019-01-31 NOTE — L&D DELIVERY NOTE
[facility]    Vaginal Delivery Note    Kait Jane Patient Status:  Inpatient    10/16/1980 MRN [id number]   Location [address] [facility] Attending Vanesa Pagan, [address] Day # 0 PCP Edgar Banda

## 2019-01-31 NOTE — LACTATION NOTE
This note was copied from a baby's chart.   LACTATION NOTE - INFANT    Evaluation Type  Evaluation Type: Inpatient    Problems & Assessment  Problems Diagnosed or Identified: Ankyloglossia(per Dr. Devine Decree 80 H&P)  Infant Assessment: Skin color: pink or

## 2019-02-01 VITALS
HEART RATE: 75 BPM | RESPIRATION RATE: 18 BRPM | TEMPERATURE: 98 F | OXYGEN SATURATION: 100 % | DIASTOLIC BLOOD PRESSURE: 58 MMHG | WEIGHT: 152 LBS | SYSTOLIC BLOOD PRESSURE: 131 MMHG | HEIGHT: 67 IN | BODY MASS INDEX: 23.86 KG/M2

## 2019-02-01 LAB
DEPRECATED RDW RBC AUTO: 42.1 FL (ref 35.1–46.3)
ERYTHROCYTE [DISTWIDTH] IN BLOOD BY AUTOMATED COUNT: 12.2 % (ref 11–15)
HCT VFR BLD AUTO: 31.7 % (ref 35–48)
HGB BLD-MCNC: 10.8 G/DL (ref 12–16)
MCH RBC QN AUTO: 32.2 PG (ref 26–34)
MCHC RBC AUTO-ENTMCNC: 34.1 G/DL (ref 31–37)
MCV RBC AUTO: 94.6 FL (ref 80–100)
PLATELET # BLD AUTO: 136 10(3)UL (ref 150–450)
RBC # BLD AUTO: 3.35 X10(6)UL (ref 3.8–5.3)
WBC # BLD AUTO: 8.3 X10(3) UL (ref 4–11)

## 2019-02-01 PROCEDURE — 85027 COMPLETE CBC AUTOMATED: CPT | Performed by: ADVANCED PRACTICE MIDWIFE

## 2019-02-01 NOTE — PROGRESS NOTES
[facility] - [facility]    OB/GYNE Progress Note      Rohit Gil Patient Status:  Inpatient    10/16/1980 MRN    Location [facility] 3SE Attending Judith Guerra #  PCP            HPI:   Rohit Gil is a 41year old f Smoking status: Never Smoker      Smokeless tobacco: Never Used    Alcohol use:  Yes      Alcohol/week: 1.8 oz      Types: 3 Glasses of wine per week    Drug use: No      GYN hx:          REVIEW OF SYSTEMS:     GENERAL: feels well,NAD  LUNGS: denies shortn

## 2019-02-01 NOTE — LACTATION NOTE
LACTATION NOTE - MOTHER      Evaluation Type: Inpatient    Problems identified  Problems identified: Knowledge deficit    Breastfeeding goal  Breastfeeding goal: To maintain breast milk feeding per patient goal    Maternal Assessment  Bilateral Breasts: Sy

## 2019-03-19 ENCOUNTER — POSTPARTUM (OUTPATIENT)
Dept: OBGYN CLINIC | Facility: CLINIC | Age: 39
End: 2019-03-19
Payer: COMMERCIAL

## 2019-03-19 ENCOUNTER — APPOINTMENT (OUTPATIENT)
Dept: LAB | Facility: HOSPITAL | Age: 39
End: 2019-03-19
Attending: ADVANCED PRACTICE MIDWIFE
Payer: COMMERCIAL

## 2019-03-19 VITALS
BODY MASS INDEX: 21.21 KG/M2 | WEIGHT: 135.13 LBS | SYSTOLIC BLOOD PRESSURE: 108 MMHG | DIASTOLIC BLOOD PRESSURE: 76 MMHG | HEART RATE: 72 BPM | HEIGHT: 67 IN

## 2019-03-19 DIAGNOSIS — D69.6 TEMPORARY LOW PLATELET COUNT (HCC): ICD-10-CM

## 2019-03-19 DIAGNOSIS — Z34.83 PRENATAL CARE, SUBSEQUENT PREGNANCY, THIRD TRIMESTER: Primary | ICD-10-CM

## 2019-03-19 LAB
DEPRECATED RDW RBC AUTO: 39 FL (ref 35.1–46.3)
ERYTHROCYTE [DISTWIDTH] IN BLOOD BY AUTOMATED COUNT: 11.1 % (ref 11–15)
HCT VFR BLD AUTO: 42.7 % (ref 35–48)
HGB BLD-MCNC: 14.2 G/DL (ref 12–16)
MCH RBC QN AUTO: 31.8 PG (ref 26–34)
MCHC RBC AUTO-ENTMCNC: 33.3 G/DL (ref 31–37)
MCV RBC AUTO: 95.7 FL (ref 80–100)
PLATELET # BLD AUTO: 200 10(3)UL (ref 150–450)
RBC # BLD AUTO: 4.46 X10(6)UL (ref 3.8–5.3)
WBC # BLD AUTO: 6.9 X10(3) UL (ref 4–11)

## 2019-03-19 PROCEDURE — 36415 COLL VENOUS BLD VENIPUNCTURE: CPT

## 2019-03-19 PROCEDURE — 85027 COMPLETE CBC AUTOMATED: CPT

## 2019-03-19 NOTE — PROGRESS NOTES
Patient here for postpartum check-up. spontaneous vaginal delivery 1/30/2019  with Marla Elder     Breastfeeding exclusively, on demand. Baby with adequate weight gain. Had tongue and lip tie revised a week ago.    Denies fevers, chills, body ache Declines birth control at this time. Advised can ovulate before menses returns. Discussed normal vaginal discharge & kegals. Follow-up with routine annual exam in 1 year. Pap due 2/2021. Last pap Neg with neg HPV 2/2018, previous pap 2/2017 ASCUS Neg HPV.

## 2020-07-19 ENCOUNTER — HOSPITAL ENCOUNTER (EMERGENCY)
Facility: HOSPITAL | Age: 40
Discharge: HOME | End: 2020-07-19
Attending: EMERGENCY MEDICINE
Payer: COMMERCIAL

## 2020-07-19 ENCOUNTER — APPOINTMENT (OUTPATIENT)
Dept: GENERAL RADIOLOGY | Facility: HOSPITAL | Age: 40
End: 2020-07-19
Payer: COMMERCIAL

## 2020-07-19 VITALS
DIASTOLIC BLOOD PRESSURE: 62 MMHG | BODY MASS INDEX: 19.62 KG/M2 | OXYGEN SATURATION: 100 % | HEART RATE: 59 BPM | RESPIRATION RATE: 16 BRPM | HEIGHT: 67 IN | WEIGHT: 125 LBS | SYSTOLIC BLOOD PRESSURE: 115 MMHG | TEMPERATURE: 98 F

## 2020-07-19 DIAGNOSIS — S60.011A CONTUSION OF RIGHT THUMB WITHOUT DAMAGE TO NAIL, INITIAL ENCOUNTER: Primary | ICD-10-CM

## 2020-07-19 PROCEDURE — 99283 EMERGENCY DEPT VISIT LOW MDM: CPT

## 2020-07-19 PROCEDURE — 73130 X-RAY EXAM OF HAND: CPT

## 2020-07-19 PROCEDURE — 73140 X-RAY EXAM OF FINGER(S): CPT

## 2020-07-19 PROCEDURE — 29130 APPL FINGER SPLINT STATIC: CPT

## 2020-07-20 NOTE — ED INITIAL ASSESSMENT (HPI)
Pt's toddler fell on patient's right hand and felt her right thumb hyperextend and felt a \"pop. \" Pt states the pain goes from her right thumb up to her arm. Cms intact.

## 2020-07-20 NOTE — ED PROVIDER NOTES
Patient Seen in: [facility] AND [facility] Emergency Department      History   No chief complaint on file. Stated Complaint: Finger Injury    HPI  42 yoF right-handed female presenting for evaluation of right thumb injury.   She is an  and a p BMI 19.58 kg/m²         Physical Exam  Vitals signs and nursing note reviewed. Constitutional:       Appearance: She is well-developed. HENT:      Head: Normocephalic and atraumatic. Neck:      Musculoskeletal: Normal range of motion.    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 ER return precautions given and she is agreeable to the plan.       Disposition and Plan     Clinical Impression:  Contusion of right thumb without damage to

## 2020-10-06 ENCOUNTER — IMMUNIZATION (OUTPATIENT)
Dept: PEDIATRICS CLINIC | Facility: CLINIC | Age: 40
End: 2020-10-06
Payer: COMMERCIAL

## 2020-10-06 DIAGNOSIS — Z23 NEED FOR VACCINATION: ICD-10-CM

## 2020-10-06 PROCEDURE — 90471 IMMUNIZATION ADMIN: CPT | Performed by: PEDIATRICS

## 2020-10-06 PROCEDURE — 90686 IIV4 VACC NO PRSV 0.5 ML IM: CPT | Performed by: PEDIATRICS

## 2021-05-27 ENCOUNTER — OFFICE VISIT (OUTPATIENT)
Dept: OBGYN CLINIC | Facility: CLINIC | Age: 41
End: 2021-05-27
Payer: COMMERCIAL

## 2021-05-27 VITALS
WEIGHT: 127.81 LBS | HEIGHT: 66 IN | HEART RATE: 64 BPM | BODY MASS INDEX: 20.54 KG/M2 | SYSTOLIC BLOOD PRESSURE: 109 MMHG | DIASTOLIC BLOOD PRESSURE: 70 MMHG

## 2021-05-27 DIAGNOSIS — N92.6 MISSED MENSES: Primary | ICD-10-CM

## 2021-05-27 PROCEDURE — 3078F DIAST BP <80 MM HG: CPT | Performed by: ADVANCED PRACTICE MIDWIFE

## 2021-05-27 PROCEDURE — 3074F SYST BP LT 130 MM HG: CPT | Performed by: ADVANCED PRACTICE MIDWIFE

## 2021-05-27 PROCEDURE — 81025 URINE PREGNANCY TEST: CPT | Performed by: ADVANCED PRACTICE MIDWIFE

## 2021-05-27 PROCEDURE — 99213 OFFICE O/P EST LOW 20 MIN: CPT | Performed by: ADVANCED PRACTICE MIDWIFE

## 2021-05-27 PROCEDURE — 3008F BODY MASS INDEX DOCD: CPT | Performed by: ADVANCED PRACTICE MIDWIFE

## 2021-05-27 NOTE — PROGRESS NOTES
HPI/Subjective:   Patient ID: Celia Fine is a 47year old female. [id number] with LMP 4/16/21 with 26-30 day cycles. 5.6wks by LMP. Reports all day nausea and fatigue. Denies vomiting, pelvic pain and vaginal bleeding.      Had second covid vaccine on d office if occur. 7.  First Trimester chromosomal screening Cell free Fetal DNA and US schedule discussed. Desires First trimester screen testing  8. Genetic screening and ACOG/ ACMG guidelines for CF, SMA, Fragile X & hemoglobinopathies.   Informrd our

## 2021-05-28 PROBLEM — O42.90 AMNIOTIC FLUID LEAKING: Status: RESOLVED | Noted: 2019-01-30 | Resolved: 2021-05-28

## 2021-05-28 PROBLEM — O46.8X2 SUBCHORIONIC HEMATOMA IN SECOND TRIMESTER (HCC): Status: RESOLVED | Noted: 2018-09-12 | Resolved: 2021-05-28

## 2021-05-28 PROBLEM — O98.819 GROUP B STREPTOCOCCAL INFECTION DURING PREGNANCY: Status: RESOLVED | Noted: 2019-01-08 | Resolved: 2021-05-28

## 2021-05-28 PROBLEM — O41.8X20 SUBCHORIONIC HEMATOMA IN SECOND TRIMESTER (HCC): Status: RESOLVED | Noted: 2018-09-12 | Resolved: 2021-05-28

## 2021-05-28 PROBLEM — O46.8X2 SUBCHORIONIC HEMATOMA IN SECOND TRIMESTER: Status: RESOLVED | Noted: 2018-09-12 | Resolved: 2021-05-28

## 2021-05-28 PROBLEM — O99.810 GLUCOSE INTOLERANCE OF PREGNANCY: Status: RESOLVED | Noted: 2018-07-24 | Resolved: 2021-05-28

## 2021-05-28 PROBLEM — O42.90 AMNIOTIC FLUID LEAKING (HCC): Status: RESOLVED | Noted: 2019-01-30 | Resolved: 2021-05-28

## 2021-05-28 PROBLEM — O41.8X20 SUBCHORIONIC HEMATOMA IN SECOND TRIMESTER: Status: RESOLVED | Noted: 2018-09-12 | Resolved: 2021-05-28

## 2021-05-28 PROBLEM — B95.1 GROUP B STREPTOCOCCAL INFECTION DURING PREGNANCY: Status: RESOLVED | Noted: 2019-01-08 | Resolved: 2021-05-28

## 2021-05-28 PROBLEM — O99.810 GLUCOSE INTOLERANCE OF PREGNANCY (HCC): Status: RESOLVED | Noted: 2018-07-24 | Resolved: 2021-05-28

## 2021-05-28 PROBLEM — O98.819 GROUP B STREPTOCOCCAL INFECTION DURING PREGNANCY (HCC): Status: RESOLVED | Noted: 2019-01-08 | Resolved: 2021-05-28

## 2021-05-28 PROBLEM — B95.1 GROUP B STREPTOCOCCAL INFECTION DURING PREGNANCY (HCC): Status: RESOLVED | Noted: 2019-01-08 | Resolved: 2021-05-28

## 2021-05-31 ENCOUNTER — TELEPHONE (OUTPATIENT)
Dept: OBGYN CLINIC | Facility: CLINIC | Age: 41
End: 2021-05-31

## 2021-05-31 NOTE — TELEPHONE ENCOUNTER
Brittney More is a [id number] at 6.3wks. She reports red bleeding this morning. Denies soaking through pads and denies pelvic pain. Reports she has had a miscarriage before and this seems like the same. Encouraged her to monitor symptoms.  If soaking through >1 pad per

## 2021-06-01 ENCOUNTER — OFFICE VISIT (OUTPATIENT)
Dept: OBGYN CLINIC | Facility: CLINIC | Age: 41
End: 2021-06-01
Payer: COMMERCIAL

## 2021-06-01 ENCOUNTER — TELEPHONE (OUTPATIENT)
Dept: OBGYN CLINIC | Facility: CLINIC | Age: 41
End: 2021-06-01

## 2021-06-01 ENCOUNTER — LAB ENCOUNTER (OUTPATIENT)
Dept: LAB | Facility: HOSPITAL | Age: 41
End: 2021-06-01
Attending: ADVANCED PRACTICE MIDWIFE
Payer: COMMERCIAL

## 2021-06-01 VITALS
HEIGHT: 66 IN | WEIGHT: 129 LBS | DIASTOLIC BLOOD PRESSURE: 72 MMHG | SYSTOLIC BLOOD PRESSURE: 113 MMHG | BODY MASS INDEX: 20.73 KG/M2 | HEART RATE: 60 BPM

## 2021-06-01 DIAGNOSIS — O20.9 BLEEDING IN EARLY PREGNANCY: ICD-10-CM

## 2021-06-01 DIAGNOSIS — O20.9 BLEEDING IN EARLY PREGNANCY: Primary | ICD-10-CM

## 2021-06-01 PROCEDURE — 99213 OFFICE O/P EST LOW 20 MIN: CPT | Performed by: ADVANCED PRACTICE MIDWIFE

## 2021-06-01 PROCEDURE — 36415 COLL VENOUS BLD VENIPUNCTURE: CPT

## 2021-06-01 PROCEDURE — 81025 URINE PREGNANCY TEST: CPT | Performed by: ADVANCED PRACTICE MIDWIFE

## 2021-06-01 PROCEDURE — 3078F DIAST BP <80 MM HG: CPT | Performed by: ADVANCED PRACTICE MIDWIFE

## 2021-06-01 PROCEDURE — 84144 ASSAY OF PROGESTERONE: CPT

## 2021-06-01 PROCEDURE — 84702 CHORIONIC GONADOTROPIN TEST: CPT

## 2021-06-01 PROCEDURE — 3008F BODY MASS INDEX DOCD: CPT | Performed by: ADVANCED PRACTICE MIDWIFE

## 2021-06-01 PROCEDURE — 3074F SYST BP LT 130 MM HG: CPT | Performed by: ADVANCED PRACTICE MIDWIFE

## 2021-06-01 NOTE — PROGRESS NOTES
Syed Schultz is a 47year old female. HPI:             Syed Schultz is a 47year old female. [id number] Patient's last menstrual period was 04/16/2021 (exact date).     Patient presents with:  Gyn Exam: Bleeding in early pregnancy     Here with bleedin (VITAMIN D) 1000 UNITS Oral Cap Take  by mouth. • PRENATAL MULTI +DHA (PNV WITH DHA) 27-0.8-228 MG Oral Cap Take 1 capsule by mouth daily. Allergies:  No Known Allergies      ROS:   Review of Systems   Constitutional: Positive for fatigue.  Connie Mccarthy place, and time. Blood type O+     ASSESSMENT/PLAN:      Diagnoses and all orders for this visit:    Bleeding in early pregnancy  -     URINE PREGNANCY TEST  -     HCG, BETA SUBUNIT (QUANT PREGNANCY TEST);  Standing  -     PROGESTERONE; Future

## 2021-06-01 NOTE — TELEPHONE ENCOUNTER
----- Message from Jose Enrique Hale CNM sent at 6/1/2021  7:04 AM CDT -----  Please call patient today to check on her if she has not already called. She called yesterday 5/31 reporting likely SAB. See note.  I told her to call this morning to schedule an appoi

## 2021-06-02 ENCOUNTER — HOSPITAL ENCOUNTER (OUTPATIENT)
Dept: ULTRASOUND IMAGING | Facility: HOSPITAL | Age: 41
Discharge: HOME | End: 2021-06-02
Attending: ADVANCED PRACTICE MIDWIFE
Payer: COMMERCIAL

## 2021-06-02 DIAGNOSIS — O20.9 BLEEDING IN EARLY PREGNANCY: ICD-10-CM

## 2021-06-02 DIAGNOSIS — O20.9 BLEEDING IN EARLY PREGNANCY: Primary | ICD-10-CM

## 2021-06-02 PROCEDURE — 76801 OB US < 14 WKS SINGLE FETUS: CPT | Performed by: ADVANCED PRACTICE MIDWIFE

## 2021-06-02 PROCEDURE — 93975 VASCULAR STUDY: CPT | Performed by: ADVANCED PRACTICE MIDWIFE

## 2021-06-03 ENCOUNTER — TELEPHONE (OUTPATIENT)
Dept: OBGYN CLINIC | Facility: CLINIC | Age: 41
End: 2021-06-03

## 2021-06-03 DIAGNOSIS — O09.529 ANTEPARTUM MULTIGRAVIDA OF ADVANCED MATERNAL AGE: Primary | ICD-10-CM

## 2021-06-03 NOTE — TELEPHONE ENCOUNTER
Spoke w/ TM & MES & ok to notify pt of normal ultrasound w/ viable pregnancy. Discussed results, fibroid & hemorrhage. All questions answered. Instructed pt to call w/ further bleeding. Nurse ED visit scheduled.  NIPT & MFM Consult ordered & phone call motta

## 2021-06-05 ENCOUNTER — TELEPHONE (OUTPATIENT)
Dept: OBGYN CLINIC | Facility: CLINIC | Age: 41
End: 2021-06-05

## 2021-06-06 NOTE — TELEPHONE ENCOUNTER
PC from pt this evening reporting dark red /orange blood. Not soaking through pad. Very mild cramping.  Her mother did have a heart attack Wed night and is having bypass surgery so has been going back and forth to hospital. She had a normal ultrasound on We

## 2021-06-07 NOTE — TELEPHONE ENCOUNTER
F/u w/ pt. She states her bleeding has decreased to old dark red/orange when wiping. Offered appt w/ cnm or ultrasound appt today.  Pt states she won't have time today as her mother had a heart attack & shes running back & forth to the hospital. Support jamie

## 2021-06-10 ENCOUNTER — OFFICE VISIT (OUTPATIENT)
Dept: OBGYN CLINIC | Facility: CLINIC | Age: 41
End: 2021-06-10
Payer: COMMERCIAL

## 2021-06-10 VITALS
HEIGHT: 66 IN | HEART RATE: 69 BPM | BODY MASS INDEX: 20.79 KG/M2 | DIASTOLIC BLOOD PRESSURE: 66 MMHG | SYSTOLIC BLOOD PRESSURE: 103 MMHG | WEIGHT: 129.38 LBS

## 2021-06-10 DIAGNOSIS — O20.9 BLEEDING IN EARLY PREGNANCY: Primary | ICD-10-CM

## 2021-06-10 DIAGNOSIS — O21.9 NAUSEA AND VOMITING IN PREGNANCY: ICD-10-CM

## 2021-06-10 PROCEDURE — 99213 OFFICE O/P EST LOW 20 MIN: CPT | Performed by: ADVANCED PRACTICE MIDWIFE

## 2021-06-10 PROCEDURE — 81002 URINALYSIS NONAUTO W/O SCOPE: CPT | Performed by: ADVANCED PRACTICE MIDWIFE

## 2021-06-10 PROCEDURE — 3008F BODY MASS INDEX DOCD: CPT | Performed by: ADVANCED PRACTICE MIDWIFE

## 2021-06-10 PROCEDURE — 3074F SYST BP LT 130 MM HG: CPT | Performed by: ADVANCED PRACTICE MIDWIFE

## 2021-06-10 PROCEDURE — 3078F DIAST BP <80 MM HG: CPT | Performed by: ADVANCED PRACTICE MIDWIFE

## 2021-06-10 RX ORDER — DOXYLAMINE SUCCINATE AND PYRIDOXINE HYDROCHLORIDE, DELAYED RELEASE TABLETS 10 MG/10 MG 10; 10 MG/1; MG/1
2 TABLET, DELAYED RELEASE ORAL NIGHTLY
Qty: 120 TABLET | Refills: 0 | Status: SHIPPED | OUTPATIENT
Start: 2021-06-10 | End: 2021-07-12

## 2021-06-10 NOTE — PROGRESS NOTES
HPI/Subjective:   Patient ID: Kait Jane is a 47year old female. Nevaeh hWitman is 7.6wks by LMP. She has had ongoing vaginal bleeding. She had an ultrasound on 6/2/21 which showed an SIUP at 6.5wks.  Since then she has continued to have brown spotting on and adnexa normal.      Comments: Cervix closed  Skin:     General: Skin is warm and dry. Neurological:      Mental Status: She is alert and oriented to person, place, and time.    Psychiatric:         Mood and Affect: Mood normal.         Behavior: Behavior

## 2021-06-14 ENCOUNTER — PATIENT MESSAGE (OUTPATIENT)
Dept: OBGYN CLINIC | Facility: CLINIC | Age: 41
End: 2021-06-14

## 2021-06-14 DIAGNOSIS — O20.9 BLEEDING IN EARLY PREGNANCY: Primary | ICD-10-CM

## 2021-06-14 NOTE — TELEPHONE ENCOUNTER
Phone call to patient. She has continued to have spotting over the last 4 days. We had planned for her to call Friday morning and try to get in for a hold and call ultrasound but her mother passed away on Friday.  When she called to schedule an ultrasound s

## 2021-06-16 ENCOUNTER — HOSPITAL ENCOUNTER (OUTPATIENT)
Dept: ULTRASOUND IMAGING | Facility: HOSPITAL | Age: 41
Discharge: HOME | End: 2021-06-16
Attending: ADVANCED PRACTICE MIDWIFE
Payer: COMMERCIAL

## 2021-06-16 DIAGNOSIS — O20.9 BLEEDING IN EARLY PREGNANCY: ICD-10-CM

## 2021-06-16 PROCEDURE — 76801 OB US < 14 WKS SINGLE FETUS: CPT | Performed by: ADVANCED PRACTICE MIDWIFE

## 2021-06-16 PROCEDURE — 76817 TRANSVAGINAL US OBSTETRIC: CPT | Performed by: ADVANCED PRACTICE MIDWIFE

## 2021-06-16 NOTE — TELEPHONE ENCOUNTER
Hold and call ultrasound scheduled today at 5PM. Pt notified and instructions reviewed with pt. Pt agreed and voiced understanding.

## 2021-06-17 ENCOUNTER — TELEPHONE (OUTPATIENT)
Dept: OBGYN CLINIC | Facility: CLINIC | Age: 41
End: 2021-06-17

## 2021-06-17 NOTE — TELEPHONE ENCOUNTER
----- Message from Jaycee oSusa CNM sent at 6/16/2021  6:52 PM CDT -----  Regarding: [facility] u/s  Can you call [facility] to see if they can move up her 1st tri screen u/s. She has been having bleeding for a few weeks.  Has subchorionic bleed which has increase

## 2021-06-18 NOTE — TELEPHONE ENCOUNTER
Austin Preciado, ELISE  P Em Ob/Gyne Midwifery Clinical Pool  Caller: Unspecified (Yesterday,  9:53 AM)  Ok I guess it can just stay where it is then. I thought they could do as soon as 11 wks.  Please let pt know that we checked and they dont actually do

## 2021-06-19 ENCOUNTER — NURSE ONLY (OUTPATIENT)
Dept: OBGYN CLINIC | Facility: CLINIC | Age: 41
End: 2021-06-19
Payer: COMMERCIAL

## 2021-06-19 VITALS — WEIGHT: 129 LBS | BODY MASS INDEX: 21 KG/M2

## 2021-06-19 DIAGNOSIS — O09.521 MULTIGRAVIDA OF ADVANCED MATERNAL AGE IN FIRST TRIMESTER: Primary | ICD-10-CM

## 2021-06-19 RX ORDER — DIPHENHYDRAMINE HYDROCHLORIDE 25 MG/1
25 CAPSULE ORAL DAILY
COMMUNITY
End: 2021-09-07

## 2021-06-19 RX ORDER — RIBOFLAVIN (VITAMIN B2) 100 MG
100 TABLET ORAL DAILY
COMMUNITY

## 2021-06-19 NOTE — PROGRESS NOTES
Nurse education complete via phone visit. Pt instructed to review folder & handouts. Pt AMA. TSH, 1hr gtt, HA1C, ordered w/ NOB labs. Cell free DNA desired & already scheduled. Pt has a hx of abnormal paps. ;ast pap 2018 & in Epic.  Pt to complete EPDS & GA

## 2021-07-07 ENCOUNTER — LAB ENCOUNTER (OUTPATIENT)
Dept: LAB | Facility: HOSPITAL | Age: 41
End: 2021-07-07
Attending: ADVANCED PRACTICE MIDWIFE
Payer: COMMERCIAL

## 2021-07-07 DIAGNOSIS — O09.521 MULTIGRAVIDA OF ADVANCED MATERNAL AGE IN FIRST TRIMESTER: ICD-10-CM

## 2021-07-07 LAB
ANTIBODY SCREEN: NEGATIVE
BASOPHILS # BLD AUTO: 0.02 X10(3) UL (ref 0–0.2)
BASOPHILS NFR BLD AUTO: 0.3 %
DEPRECATED RDW RBC AUTO: 40.4 FL (ref 35.1–46.3)
EOSINOPHIL # BLD AUTO: 0.01 X10(3) UL (ref 0–0.7)
EOSINOPHIL NFR BLD AUTO: 0.2 %
ERYTHROCYTE [DISTWIDTH] IN BLOOD BY AUTOMATED COUNT: 11.8 % (ref 11–15)
EST. AVERAGE GLUCOSE BLD GHB EST-MCNC: 103 MG/DL (ref 68–126)
GLUCOSE 1H P GLC SERPL-MCNC: 174 MG/DL
HBA1C MFR BLD HPLC: 5.2 % (ref ?–5.7)
HBV SURFACE AG SER-ACNC: 0.16 [IU]/L
HBV SURFACE AG SERPL QL IA: NONREACTIVE
HCT VFR BLD AUTO: 35.7 %
HCV AB SERPL QL IA: NONREACTIVE
HGB BLD-MCNC: 12.2 G/DL
IMM GRANULOCYTES # BLD AUTO: 0.02 X10(3) UL (ref 0–1)
IMM GRANULOCYTES NFR BLD: 0.3 %
LYMPHOCYTES # BLD AUTO: 0.86 X10(3) UL (ref 1–4)
LYMPHOCYTES NFR BLD AUTO: 13.6 %
MCH RBC QN AUTO: 32.1 PG (ref 26–34)
MCHC RBC AUTO-ENTMCNC: 34.2 G/DL (ref 31–37)
MCV RBC AUTO: 93.9 FL
MONOCYTES # BLD AUTO: 0.4 X10(3) UL (ref 0.1–1)
MONOCYTES NFR BLD AUTO: 6.3 %
NEUTROPHILS # BLD AUTO: 5.03 X10 (3) UL (ref 1.5–7.7)
NEUTROPHILS # BLD AUTO: 5.03 X10(3) UL (ref 1.5–7.7)
NEUTROPHILS NFR BLD AUTO: 79.3 %
PLATELET # BLD AUTO: 150 10(3)UL (ref 150–450)
RBC # BLD AUTO: 3.8 X10(6)UL
RH BLOOD TYPE: POSITIVE
RUBV IGG SER QL: POSITIVE
RUBV IGG SER-ACNC: 187.9 IU/ML (ref 10–?)
TSI SER-ACNC: 0.5 MIU/ML (ref 0.36–3.74)
WBC # BLD AUTO: 6.3 X10(3) UL (ref 4–11)

## 2021-07-07 PROCEDURE — 86780 TREPONEMA PALLIDUM: CPT

## 2021-07-07 PROCEDURE — 86900 BLOOD TYPING SEROLOGIC ABO: CPT

## 2021-07-07 PROCEDURE — 36415 COLL VENOUS BLD VENIPUNCTURE: CPT

## 2021-07-07 PROCEDURE — 83036 HEMOGLOBIN GLYCOSYLATED A1C: CPT

## 2021-07-07 PROCEDURE — 85025 COMPLETE CBC W/AUTO DIFF WBC: CPT

## 2021-07-07 PROCEDURE — 86803 HEPATITIS C AB TEST: CPT

## 2021-07-07 PROCEDURE — 86901 BLOOD TYPING SEROLOGIC RH(D): CPT

## 2021-07-07 PROCEDURE — 86850 RBC ANTIBODY SCREEN: CPT

## 2021-07-07 PROCEDURE — 87086 URINE CULTURE/COLONY COUNT: CPT

## 2021-07-07 PROCEDURE — 84443 ASSAY THYROID STIM HORMONE: CPT

## 2021-07-07 PROCEDURE — 87340 HEPATITIS B SURFACE AG IA: CPT

## 2021-07-07 PROCEDURE — 82950 GLUCOSE TEST: CPT

## 2021-07-07 PROCEDURE — 87389 HIV-1 AG W/HIV-1&-2 AB AG IA: CPT

## 2021-07-07 PROCEDURE — 86762 RUBELLA ANTIBODY: CPT

## 2021-07-07 PROCEDURE — 87077 CULTURE AEROBIC IDENTIFY: CPT

## 2021-07-09 LAB — T PALLIDUM AB SER QL: NEGATIVE

## 2021-07-11 NOTE — PROGRESS NOTES
Outpatient Maternal-Fetal Medicine Consultation    Dear Ms. Javier Solis    Thank you for requesting ultrasound evaluation and maternal fetal medicine consultation on your patient Dylon Witt.   As you are aware she is a 47year old female [id number] with a single • Ascorbic Acid (VITAMIN C) 100 MG Oral Tab Take 100 mg by mouth daily. • doxylamine-pyridoxine (DICLEGIS) 10-10 MG Oral Tab EC Take 2 tablets by mouth nightly.  (Patient not taking: Reported on 6/19/2021 ) 120 tablet 0   • CALCIUM OR Take 1,200 mg kg/m²   Physical Exam  Constitutional:       Appearance: Normal appearance. Abdominal:      Palpations: Abdomen is soft. Tenderness: There is no abdominal tenderness. Neurological:      Mental Status: She is alert.    Psychiatric:         Mood and these  pregnanccies are hypertension and diabetes. The incidence of preeclampsia in the general obstetric population is 3 to 4 percent; this increases to 5 to 10 percent in women over age 36 and is as high as 28 percent in women over age 48.    The incide a normal karyotype. Fetal Aneuploidy         We discussed  the increased risk of chromosomal abnormalities associated with advanced maternal age at age  46year old. She understands that ultrasound exam cannot exclude potential genetic abnormalities. two-thirds being 130,000 to 150,000/microL. The frequency of gestational thrombocytopenia in the largest series of consecutive women admitted for labor and delivery is 5 percent.    thrombocytopenia essentially does not occur in infants born to moth

## 2021-07-12 ENCOUNTER — HOSPITAL ENCOUNTER (OUTPATIENT)
Dept: PERINATAL CARE | Facility: HOSPITAL | Age: 41
Discharge: HOME | End: 2021-07-12
Attending: OBSTETRICS & GYNECOLOGY
Payer: COMMERCIAL

## 2021-07-12 ENCOUNTER — INITIAL PRENATAL (OUTPATIENT)
Dept: OBGYN CLINIC | Facility: CLINIC | Age: 41
End: 2021-07-12
Payer: COMMERCIAL

## 2021-07-12 ENCOUNTER — HOSPITAL ENCOUNTER (OUTPATIENT)
Dept: PERINATAL CARE | Facility: HOSPITAL | Age: 41
Discharge: HOME | End: 2021-07-12
Attending: ADVANCED PRACTICE MIDWIFE
Payer: COMMERCIAL

## 2021-07-12 VITALS — BODY MASS INDEX: 21 KG/M2 | WEIGHT: 130.19 LBS | DIASTOLIC BLOOD PRESSURE: 62 MMHG | SYSTOLIC BLOOD PRESSURE: 104 MMHG

## 2021-07-12 VITALS
WEIGHT: 130 LBS | BODY MASS INDEX: 21 KG/M2 | SYSTOLIC BLOOD PRESSURE: 113 MMHG | HEART RATE: 72 BPM | DIASTOLIC BLOOD PRESSURE: 71 MMHG

## 2021-07-12 DIAGNOSIS — D69.6 THROMBOCYTOPENIA AFFECTING PREGNANCY (HCC): ICD-10-CM

## 2021-07-12 DIAGNOSIS — O99.119 THROMBOCYTOPENIA AFFECTING PREGNANCY (HCC): ICD-10-CM

## 2021-07-12 DIAGNOSIS — Z36.9 FIRST TRIMESTER SCREENING: ICD-10-CM

## 2021-07-12 DIAGNOSIS — Z36.9 1ST TRIMESTER SCREENING: ICD-10-CM

## 2021-07-12 DIAGNOSIS — Z34.81 ENCOUNTER FOR SUPERVISION OF OTHER NORMAL PREGNANCY IN FIRST TRIMESTER: Primary | ICD-10-CM

## 2021-07-12 DIAGNOSIS — Z36.9 FIRST TRIMESTER SCREENING: Primary | ICD-10-CM

## 2021-07-12 DIAGNOSIS — O09.521 MULTIGRAVIDA OF ADVANCED MATERNAL AGE IN FIRST TRIMESTER: ICD-10-CM

## 2021-07-12 PROBLEM — R82.71 GBS BACTERIURIA: Status: ACTIVE | Noted: 2021-07-12

## 2021-07-12 LAB
APPEARANCE: CLEAR
BILIRUBIN: NEGATIVE
GLUCOSE (URINE DIPSTICK): NEGATIVE MG/DL
KETONES (URINE DIPSTICK): NEGATIVE MG/DL
LEUKOCYTES: NEGATIVE
MULTISTIX LOT#: NORMAL NUMERIC
NITRITE, URINE: NEGATIVE
OCCULT BLOOD: NEGATIVE
PH, URINE: 6.5 (ref 4.5–8)
PROTEIN (URINE DIPSTICK): NEGATIVE MG/DL
SPECIFIC GRAVITY: 1.01 (ref 1–1.03)
URINE-COLOR: YELLOW
UROBILINOGEN,SEMI-QN: 0.2 MG/DL (ref 0–1.9)

## 2021-07-12 PROCEDURE — 3078F DIAST BP <80 MM HG: CPT | Performed by: ADVANCED PRACTICE MIDWIFE

## 2021-07-12 PROCEDURE — 3074F SYST BP LT 130 MM HG: CPT | Performed by: ADVANCED PRACTICE MIDWIFE

## 2021-07-12 PROCEDURE — 99243 OFF/OP CNSLTJ NEW/EST LOW 30: CPT | Performed by: OBSTETRICS & GYNECOLOGY

## 2021-07-12 PROCEDURE — 81002 URINALYSIS NONAUTO W/O SCOPE: CPT | Performed by: ADVANCED PRACTICE MIDWIFE

## 2021-07-12 PROCEDURE — 76813 OB US NUCHAL MEAS 1 GEST: CPT | Performed by: OBSTETRICS & GYNECOLOGY

## 2021-07-13 NOTE — PROGRESS NOTES
Mom passed suddenly in the last few months. Stressed about recurrent bleeding. Normal NT this am. Reviewed danger signs - ok to come in for [facility] check at any time.

## 2021-07-19 ENCOUNTER — TELEPHONE (OUTPATIENT)
Dept: PERINATAL CARE | Facility: HOSPITAL | Age: 41
End: 2021-07-19

## 2021-07-19 ENCOUNTER — TELEPHONE (OUTPATIENT)
Dept: OBGYN CLINIC | Facility: CLINIC | Age: 41
End: 2021-07-19

## 2021-07-19 NOTE — TELEPHONE ENCOUNTER
HARMONY screening results obt  Reviewed by DR Ervin Ramirez  Low risk for  Trisomy 21  1:45530 risk  Low risk for Trisomy 18/13  1:23663 risk    Fetal sex: held for     LVMW#TCB with questions  Copy of results sent for scanning into pt record

## 2021-07-23 ENCOUNTER — TELEPHONE (OUTPATIENT)
Dept: OBGYN CLINIC | Facility: CLINIC | Age: 41
End: 2021-07-23

## 2021-07-23 DIAGNOSIS — R73.09 ELEVATED GLUCOSE TOLERANCE TEST: ICD-10-CM

## 2021-07-23 DIAGNOSIS — O09.522 AMA (ADVANCED MATERNAL AGE) MULTIGRAVIDA 35+, SECOND TRIMESTER: Primary | ICD-10-CM

## 2021-07-23 NOTE — TELEPHONE ENCOUNTER
Pt was advised the order for the 3 HR GTT has been entered. Pt can call for appt. Pt agrees with plan.

## 2021-07-23 NOTE — TELEPHONE ENCOUNTER
Pt was told to get 3-hr glucose test scheduled, but there is no order. Please advise when order has been placed.

## 2021-08-07 ENCOUNTER — LABORATORY ENCOUNTER (OUTPATIENT)
Dept: LAB | Facility: HOSPITAL | Age: 41
End: 2021-08-07
Attending: ADVANCED PRACTICE MIDWIFE
Payer: COMMERCIAL

## 2021-08-07 DIAGNOSIS — R73.09 ELEVATED GLUCOSE TOLERANCE TEST: ICD-10-CM

## 2021-08-07 DIAGNOSIS — O09.522 AMA (ADVANCED MATERNAL AGE) MULTIGRAVIDA 35+, SECOND TRIMESTER: ICD-10-CM

## 2021-08-07 LAB
1 HR GLUCOSE GESTATIONAL: 121 MG/DL
GLUCOSE 1H P GLC SERPL-MCNC: 97 MG/DL
GLUCOSE 3H P GLC SERPL-MCNC: 81 MG/DL
GLUCOSE P FAST SERPL-MCNC: 80 MG/DL

## 2021-08-07 PROCEDURE — 36415 COLL VENOUS BLD VENIPUNCTURE: CPT

## 2021-08-07 PROCEDURE — 82952 GTT-ADDED SAMPLES: CPT

## 2021-08-07 PROCEDURE — 82951 GLUCOSE TOLERANCE TEST (GTT): CPT

## 2021-08-10 ENCOUNTER — ROUTINE PRENATAL (OUTPATIENT)
Dept: OBGYN CLINIC | Facility: CLINIC | Age: 41
End: 2021-08-10
Payer: COMMERCIAL

## 2021-08-10 ENCOUNTER — LAB ENCOUNTER (OUTPATIENT)
Dept: LAB | Facility: HOSPITAL | Age: 41
End: 2021-08-10
Attending: ADVANCED PRACTICE MIDWIFE
Payer: COMMERCIAL

## 2021-08-10 VITALS
DIASTOLIC BLOOD PRESSURE: 62 MMHG | BODY MASS INDEX: 22 KG/M2 | WEIGHT: 135 LBS | HEART RATE: 64 BPM | SYSTOLIC BLOOD PRESSURE: 95 MMHG

## 2021-08-10 DIAGNOSIS — Z34.82 PRENATAL CARE, SUBSEQUENT PREGNANCY, SECOND TRIMESTER: ICD-10-CM

## 2021-08-10 DIAGNOSIS — Z34.82 PRENATAL CARE, SUBSEQUENT PREGNANCY, SECOND TRIMESTER: Primary | ICD-10-CM

## 2021-08-10 PROCEDURE — 3078F DIAST BP <80 MM HG: CPT | Performed by: ADVANCED PRACTICE MIDWIFE

## 2021-08-10 PROCEDURE — 3074F SYST BP LT 130 MM HG: CPT | Performed by: ADVANCED PRACTICE MIDWIFE

## 2021-08-10 PROCEDURE — 36415 COLL VENOUS BLD VENIPUNCTURE: CPT

## 2021-08-10 PROCEDURE — 82105 ALPHA-FETOPROTEIN SERUM: CPT

## 2021-08-10 NOTE — PROGRESS NOTES
Nausea resolved. Occasionally having some spotting/discharge. Normal 1st tri screen. Desires AFP. Has 20 wk sono scheduled. Warning signs reivewed.

## 2021-08-13 LAB
AFP SMOKING: NO
FAMILY HX NEURAL TUBE DEFECT: NO
INSULIN REQ MATERNAL DIABETES: NO
MATERNAL AGE OF DELIVERY: 39.3 YR
MOM FOR AFP: 1.28
PATIENT'S AFP: 52 NG/ML

## 2021-08-15 NOTE — TELEPHONE ENCOUNTER
SLIUP on ultrasound. FHTs 99. HCG and progesterone are low. Discussed expectant management, repeat ultrasound in one week. Reviewed danger signs again.
stated

## 2021-09-01 ENCOUNTER — HOSPITAL ENCOUNTER (OUTPATIENT)
Dept: PERINATAL CARE | Facility: HOSPITAL | Age: 41
Discharge: HOME | End: 2021-09-01
Attending: OBSTETRICS & GYNECOLOGY
Payer: COMMERCIAL

## 2021-09-01 VITALS
WEIGHT: 136 LBS | HEART RATE: 62 BPM | BODY MASS INDEX: 22 KG/M2 | SYSTOLIC BLOOD PRESSURE: 110 MMHG | DIASTOLIC BLOOD PRESSURE: 65 MMHG

## 2021-09-01 DIAGNOSIS — O99.119 THROMBOCYTOPENIA AFFECTING PREGNANCY (HCC): ICD-10-CM

## 2021-09-01 DIAGNOSIS — O09.522 MULTIGRAVIDA OF ADVANCED MATERNAL AGE IN SECOND TRIMESTER: ICD-10-CM

## 2021-09-01 DIAGNOSIS — O09.523 MULTIGRAVIDA OF ADVANCED MATERNAL AGE IN THIRD TRIMESTER: Primary | ICD-10-CM

## 2021-09-01 DIAGNOSIS — D69.6 THROMBOCYTOPENIA AFFECTING PREGNANCY (HCC): ICD-10-CM

## 2021-09-01 DIAGNOSIS — O09.523 MULTIGRAVIDA OF ADVANCED MATERNAL AGE IN THIRD TRIMESTER: ICD-10-CM

## 2021-09-01 PROCEDURE — 76811 OB US DETAILED SNGL FETUS: CPT | Performed by: OBSTETRICS & GYNECOLOGY

## 2021-09-01 PROCEDURE — 99213 OFFICE O/P EST LOW 20 MIN: CPT | Performed by: OBSTETRICS & GYNECOLOGY

## 2021-09-01 PROCEDURE — 76817 TRANSVAGINAL US OBSTETRIC: CPT | Performed by: OBSTETRICS & GYNECOLOGY

## 2021-09-01 NOTE — ADDENDUM NOTE
Encounter addended by: Jayda Ritchie on: 9/1/2021 10:13 AM   Actions taken: Visit diagnoses modified, Charge Capture section accepted

## 2021-09-01 NOTE — PROGRESS NOTES
Outpatient Maternal-Fetal Medicine Consultation    Dear Ms. Jennifer Schrader    Thank you for requesting ultrasound evaluation and maternal fetal medicine consultation on your patient Naeem Swann.   As you are aware she is a 47year old female [id number] with a single • Amenorrhea 2004    No menses for 4-6 months   • Ankylosing spondylitis (HCC)    • Dysmenorrhea     Since start of menses   • History of chicken pox age 4   • Human papilloma virus infection     Pt has a Hx of abnormal paps.   Last pap done 7/24/14  SI advanced maternal age (28 or older at delivery) are associated with elevated risks.  Specifically, there is a higher rate of:  · Fetal malformations  · Preeclampsia  · Gestational diabetes  · Intrauterine fetal death    ------------------    History short c be needed or desired. Her platelets were [id number] during her last month of pregnancy in 2019 and resolved within days of her delivery. Likely had gestational thrombocytopenia.     7-7-21   Platelets 298W    All of her questions were answered to he ages.  The signs and symptoms of  labor were discussed. IMPRESSION:   1. IUP @  19w5d  2. Scan consistent with dates  3. No fetal structural abnormalities seen  4. AMA-  B[place]ster test was low risk  5.  thrombocytopena history,   6.   History bord

## 2021-09-07 ENCOUNTER — ROUTINE PRENATAL (OUTPATIENT)
Dept: OBGYN CLINIC | Facility: CLINIC | Age: 41
End: 2021-09-07
Payer: COMMERCIAL

## 2021-09-07 VITALS
HEART RATE: 75 BPM | SYSTOLIC BLOOD PRESSURE: 94 MMHG | BODY MASS INDEX: 22.34 KG/M2 | DIASTOLIC BLOOD PRESSURE: 61 MMHG | HEIGHT: 66 IN | WEIGHT: 139 LBS

## 2021-09-07 DIAGNOSIS — Z34.82 PRENATAL CARE, SUBSEQUENT PREGNANCY, SECOND TRIMESTER: Primary | ICD-10-CM

## 2021-09-07 PROBLEM — O26.879 SHORT CERVIX AFFECTING PREGNANCY (HCC): Status: ACTIVE | Noted: 2021-09-07

## 2021-09-07 PROBLEM — D69.6 THROMBOCYTOPENIA AFFECTING PREGNANCY (HCC): Status: RESOLVED | Noted: 2019-01-24 | Resolved: 2021-09-07

## 2021-09-07 PROBLEM — O26.879 SHORT CERVIX AFFECTING PREGNANCY: Status: ACTIVE | Noted: 2021-09-07

## 2021-09-07 PROBLEM — O99.119 THROMBOCYTOPENIA AFFECTING PREGNANCY (HCC): Status: RESOLVED | Noted: 2019-01-24 | Resolved: 2021-09-07

## 2021-09-07 PROBLEM — Z34.90 PREGNANCY (HCC): Status: RESOLVED | Noted: 2019-01-09 | Resolved: 2021-09-07

## 2021-09-07 PROBLEM — Z34.90 PREGNANCY: Status: RESOLVED | Noted: 2019-01-09 | Resolved: 2021-09-07

## 2021-09-07 LAB
APPEARANCE: CLEAR
BILIRUBIN: NEGATIVE
GLUCOSE (URINE DIPSTICK): NEGATIVE MG/DL
KETONES (URINE DIPSTICK): NEGATIVE MG/DL
LEUKOCYTES: NEGATIVE
MULTISTIX LOT#: NORMAL NUMERIC
NITRITE, URINE: NEGATIVE
OCCULT BLOOD: NEGATIVE
PH, URINE: 7 (ref 4.5–8)
PROTEIN (URINE DIPSTICK): NEGATIVE MG/DL
SPECIFIC GRAVITY: 1.01 (ref 1–1.03)
URINE-COLOR: YELLOW
UROBILINOGEN,SEMI-QN: 0.2 MG/DL (ref 0–1.9)

## 2021-09-07 PROCEDURE — 3074F SYST BP LT 130 MM HG: CPT | Performed by: ADVANCED PRACTICE MIDWIFE

## 2021-09-07 PROCEDURE — 81002 URINALYSIS NONAUTO W/O SCOPE: CPT | Performed by: ADVANCED PRACTICE MIDWIFE

## 2021-09-07 PROCEDURE — 3008F BODY MASS INDEX DOCD: CPT | Performed by: ADVANCED PRACTICE MIDWIFE

## 2021-09-07 PROCEDURE — 3078F DIAST BP <80 MM HG: CPT | Performed by: ADVANCED PRACTICE MIDWIFE

## 2021-09-07 NOTE — PROGRESS NOTES
ultrasound with borderline short cervix 28 mm. Has hx of short cervix with 1st baby 24 mm and delivered at term. No further intervention at this time per MFM.  Pt reports feels like has been riding a bike sometimes especially after first standing in morning

## 2021-09-08 LAB — HPV I/H RISK 1 DNA SPEC QL NAA+PROBE: NEGATIVE

## 2021-09-21 NOTE — TELEPHONE ENCOUNTER
From: Dominique Linares  To: Easton Kramer CNM  Sent: 6/14/2021 10:51 AM CDT  Subject: Visit Follow-up Question    Jade So,    I saw you on Thursday and was told to try and make an appointment for an ultrasound. I tried and the only date they had is July 2nd. Unknown

## 2021-10-08 ENCOUNTER — ROUTINE PRENATAL (OUTPATIENT)
Dept: OBGYN CLINIC | Facility: CLINIC | Age: 41
End: 2021-10-08
Payer: COMMERCIAL

## 2021-10-08 VITALS
WEIGHT: 142 LBS | SYSTOLIC BLOOD PRESSURE: 99 MMHG | HEART RATE: 66 BPM | DIASTOLIC BLOOD PRESSURE: 66 MMHG | BODY MASS INDEX: 23 KG/M2

## 2021-10-08 DIAGNOSIS — O26.879 SHORT CERVIX AFFECTING PREGNANCY: ICD-10-CM

## 2021-10-08 DIAGNOSIS — O09.522 ADVANCED MATERNAL AGE IN MULTIGRAVIDA, SECOND TRIMESTER: Primary | ICD-10-CM

## 2021-10-08 DIAGNOSIS — Z23 NEED FOR VACCINATION: ICD-10-CM

## 2021-10-08 PROBLEM — Z87.42 H/O ABNORMAL CERVICAL PAPANICOLAOU SMEAR: Status: ACTIVE | Noted: 2021-10-08

## 2021-10-08 PROCEDURE — 81002 URINALYSIS NONAUTO W/O SCOPE: CPT | Performed by: ADVANCED PRACTICE MIDWIFE

## 2021-10-08 PROCEDURE — 3078F DIAST BP <80 MM HG: CPT | Performed by: ADVANCED PRACTICE MIDWIFE

## 2021-10-08 PROCEDURE — 3074F SYST BP LT 130 MM HG: CPT | Performed by: ADVANCED PRACTICE MIDWIFE

## 2021-10-08 PROCEDURE — 90686 IIV4 VACC NO PRSV 0.5 ML IM: CPT | Performed by: ADVANCED PRACTICE MIDWIFE

## 2021-10-08 PROCEDURE — 90471 IMMUNIZATION ADMIN: CPT | Performed by: ADVANCED PRACTICE MIDWIFE

## 2021-10-08 NOTE — PATIENT INSTRUCTIONS
Comfort Tips During Pregnancy    Talk with your healthcare provider before using pain-relieving medicine at any time during your pregnancy. First trimester tips  Nausea  · Get up slowly. Eat a few unsalted crackers before you get out of bed.   · Avoi psyllium) with your healthcare provider. Taking care of your breasts  · Avoid using harsh soaps or alcohol, which can cause excessive dryness. · Wear nursing bras.  They provide more support than regular bras and can be used after pregnancy if you breastf legs.  · Drink plenty of fluids on flights. The air in plane cabins is very dry. · Avoid hot climates or high altitudes if you are not used to them. · Avoid places where the food and water might make you sick.   · Make sure you are up-to-date on all immun together. Here’s a quick look at what’s happening to both of you. How you are changing  Even when you don’t notice it, your body is adapting to meet the needs of your growing baby. The changes in your body might also affect your moods.   Your body  Your ut think you’re having  labor, get medical help right away. Contractions alone don’t mean you’re in  labor. What matters more are changes in your cervix (the lower end of the uterus).  Symptoms of  labor include:   · Four or more contracti past early birth  · Smoking, drug, or alcohol use during pregnancy  · Multiple fetuses (twins or more)  · Problems with the shape of the uterus  · Bleeding during the pregnancy  The dangers of  birth   A baby born too soon may have health problems. factors. Ask your healthcare provider to help you understand the risk factors specific to your case. Then find out what you can do to control or reduce them. Contractions are one of the main signs of premature labor.  A contraction is different from crampi from your vagina  · Change in vaginal discharge (watery, mucus, or bloody)  · Any vaginal bleeding  · Decreased movement of your baby  Palma last reviewed this educational content on 6/1/2018  © 4818-8248 The Florence Alejandra7.  Yoon Zurita. includes:  · Activity limits to help control blood pressure. This means no heavy lifting and 8 hours per day lying down with the feet up.   · Magnesium IV (intravenous) drip during labor to prevent seizures  · Induced labor or surgical delivery by  pressure, swelling, and other symptoms should get better. For some women, problems from preeclampsia can continue after delivery. Postpartum preeclampsia that develops within the first 48 hours after delivery is rare.  Another type of postpartum preeclamps counts during the day and your baby moves fewer than 10 times in 2 hours  · Your baby moves much less often than on the days before. · You have not felt your baby move all day.   Palma last reviewed this educational content on 12/1/2017  © 2632-5817 The

## 2021-10-08 NOTE — PROGRESS NOTES
Prosper Painter is here for her [facility] [place] 6484 visit. Currently, she is feeling well. Denies 2nd trimester danger signs. Concerned about weight gain.  Many recent stressors causing more eating and lack of exercise (sudden passing of mother in spring, now caring or her father

## 2021-10-30 ENCOUNTER — LABORATORY ENCOUNTER (OUTPATIENT)
Dept: LAB | Facility: HOSPITAL | Age: 41
End: 2021-10-30
Attending: ADVANCED PRACTICE MIDWIFE
Payer: COMMERCIAL

## 2021-10-30 DIAGNOSIS — O09.522 ADVANCED MATERNAL AGE IN MULTIGRAVIDA, SECOND TRIMESTER: ICD-10-CM

## 2021-10-30 DIAGNOSIS — O20.9 BLEEDING IN EARLY PREGNANCY: ICD-10-CM

## 2021-10-30 PROCEDURE — 85027 COMPLETE CBC AUTOMATED: CPT

## 2021-10-30 PROCEDURE — 84702 CHORIONIC GONADOTROPIN TEST: CPT

## 2021-10-30 PROCEDURE — 86780 TREPONEMA PALLIDUM: CPT

## 2021-10-30 PROCEDURE — 82951 GLUCOSE TOLERANCE TEST (GTT): CPT

## 2021-10-30 PROCEDURE — 87389 HIV-1 AG W/HIV-1&-2 AB AG IA: CPT

## 2021-10-30 PROCEDURE — 36415 COLL VENOUS BLD VENIPUNCTURE: CPT

## 2021-11-01 ENCOUNTER — TELEPHONE (OUTPATIENT)
Dept: OBGYN CLINIC | Facility: CLINIC | Age: 41
End: 2021-11-01

## 2021-11-01 NOTE — TELEPHONE ENCOUNTER
----- Message from Codey Pinto CNM sent at 11/1/2021 11:46 AM CDT -----  Please notify patient by phone that her 3rd trimester labs were normal including a negative HIV result. Her platelets remain just below normal but very stable at this time.

## 2021-11-02 ENCOUNTER — ROUTINE PRENATAL (OUTPATIENT)
Dept: OBGYN CLINIC | Facility: CLINIC | Age: 41
End: 2021-11-02
Payer: COMMERCIAL

## 2021-11-02 VITALS
WEIGHT: 146 LBS | HEART RATE: 68 BPM | SYSTOLIC BLOOD PRESSURE: 104 MMHG | DIASTOLIC BLOOD PRESSURE: 71 MMHG | BODY MASS INDEX: 24 KG/M2

## 2021-11-02 DIAGNOSIS — Z34.83 ENCOUNTER FOR SUPERVISION OF OTHER NORMAL PREGNANCY IN THIRD TRIMESTER: Primary | ICD-10-CM

## 2021-11-02 DIAGNOSIS — D69.6 TEMPORARY LOW PLATELET COUNT (HCC): ICD-10-CM

## 2021-11-02 PROCEDURE — 90715 TDAP VACCINE 7 YRS/> IM: CPT | Performed by: ADVANCED PRACTICE MIDWIFE

## 2021-11-02 PROCEDURE — 3074F SYST BP LT 130 MM HG: CPT | Performed by: ADVANCED PRACTICE MIDWIFE

## 2021-11-02 PROCEDURE — 3078F DIAST BP <80 MM HG: CPT | Performed by: ADVANCED PRACTICE MIDWIFE

## 2021-11-02 PROCEDURE — 90471 IMMUNIZATION ADMIN: CPT | Performed by: ADVANCED PRACTICE MIDWIFE

## 2021-11-02 PROCEDURE — 81002 URINALYSIS NONAUTO W/O SCOPE: CPT | Performed by: ADVANCED PRACTICE MIDWIFE

## 2021-11-02 NOTE — PROGRESS NOTES
42yo, [id number] at 28 weeks and 4 days feeling well. Active fetal movement. 3rd trimester labs done. 32 week growth US scheduled. Covid booster vaccine discussed. Discussed Tdap vaccine for today, pt understands and agrees to receive today.  Platelets were a

## 2021-11-16 ENCOUNTER — ROUTINE PRENATAL (OUTPATIENT)
Dept: OBGYN CLINIC | Facility: CLINIC | Age: 41
End: 2021-11-16
Payer: COMMERCIAL

## 2021-11-16 VITALS
WEIGHT: 148 LBS | SYSTOLIC BLOOD PRESSURE: 97 MMHG | DIASTOLIC BLOOD PRESSURE: 67 MMHG | BODY MASS INDEX: 24 KG/M2 | HEART RATE: 62 BPM

## 2021-11-16 DIAGNOSIS — Z34.83 ENCOUNTER FOR SUPERVISION OF OTHER NORMAL PREGNANCY IN THIRD TRIMESTER: Primary | ICD-10-CM

## 2021-11-16 PROCEDURE — 81002 URINALYSIS NONAUTO W/O SCOPE: CPT | Performed by: ADVANCED PRACTICE MIDWIFE

## 2021-11-16 PROCEDURE — 3078F DIAST BP <80 MM HG: CPT | Performed by: ADVANCED PRACTICE MIDWIFE

## 2021-11-16 PROCEDURE — 3074F SYST BP LT 130 MM HG: CPT | Performed by: ADVANCED PRACTICE MIDWIFE

## 2021-11-16 NOTE — PROGRESS NOTES
Baby active. Feeling fatigued, not sleeping well. No contractions, LOF or bleeding. Feeling a lot of \"scratching/scraping\" low. Baby transverse with back up. Likely feeling kicks & punches. Undecided about booster. Warning signs reviewed.  Repeat CBC for

## 2021-11-29 ENCOUNTER — HOSPITAL ENCOUNTER (OUTPATIENT)
Dept: PERINATAL CARE | Facility: HOSPITAL | Age: 41
Discharge: HOME | End: 2021-11-29
Attending: OBSTETRICS & GYNECOLOGY
Payer: COMMERCIAL

## 2021-11-29 ENCOUNTER — LAB ENCOUNTER (OUTPATIENT)
Dept: LAB | Facility: HOSPITAL | Age: 41
End: 2021-11-29
Attending: OBSTETRICS & GYNECOLOGY
Payer: COMMERCIAL

## 2021-11-29 VITALS
SYSTOLIC BLOOD PRESSURE: 120 MMHG | DIASTOLIC BLOOD PRESSURE: 78 MMHG | WEIGHT: 149 LBS | HEART RATE: 91 BPM | BODY MASS INDEX: 24 KG/M2

## 2021-11-29 DIAGNOSIS — O09.523 MULTIGRAVIDA OF ADVANCED MATERNAL AGE IN THIRD TRIMESTER: Primary | ICD-10-CM

## 2021-11-29 DIAGNOSIS — O20.9 BLEEDING IN EARLY PREGNANCY: ICD-10-CM

## 2021-11-29 DIAGNOSIS — O09.523 MULTIGRAVIDA OF ADVANCED MATERNAL AGE IN THIRD TRIMESTER: ICD-10-CM

## 2021-11-29 PROCEDURE — 99213 OFFICE O/P EST LOW 20 MIN: CPT | Performed by: OBSTETRICS & GYNECOLOGY

## 2021-11-29 PROCEDURE — 84702 CHORIONIC GONADOTROPIN TEST: CPT

## 2021-11-29 PROCEDURE — 76816 OB US FOLLOW-UP PER FETUS: CPT | Performed by: OBSTETRICS & GYNECOLOGY

## 2021-11-29 PROCEDURE — 36415 COLL VENOUS BLD VENIPUNCTURE: CPT

## 2021-11-29 PROCEDURE — 76819 FETAL BIOPHYS PROFIL W/O NST: CPT | Performed by: OBSTETRICS & GYNECOLOGY

## 2021-11-30 ENCOUNTER — ROUTINE PRENATAL (OUTPATIENT)
Dept: OBGYN CLINIC | Facility: CLINIC | Age: 41
End: 2021-11-30
Payer: COMMERCIAL

## 2021-11-30 VITALS
HEART RATE: 82 BPM | DIASTOLIC BLOOD PRESSURE: 60 MMHG | SYSTOLIC BLOOD PRESSURE: 102 MMHG | BODY MASS INDEX: 24 KG/M2 | WEIGHT: 150 LBS

## 2021-11-30 DIAGNOSIS — Z34.83 ENCOUNTER FOR SUPERVISION OF OTHER NORMAL PREGNANCY IN THIRD TRIMESTER: Primary | ICD-10-CM

## 2021-11-30 PROCEDURE — 3078F DIAST BP <80 MM HG: CPT | Performed by: ADVANCED PRACTICE MIDWIFE

## 2021-11-30 PROCEDURE — 3074F SYST BP LT 130 MM HG: CPT | Performed by: ADVANCED PRACTICE MIDWIFE

## 2021-11-30 PROCEDURE — 81002 URINALYSIS NONAUTO W/O SCOPE: CPT | Performed by: ADVANCED PRACTICE MIDWIFE

## 2021-11-30 NOTE — PROGRESS NOTES
Active fetus Denies any complaints. Denies any vaginal bleeding, leaking of fluid or vaginal discharge. No signs signs of PTL. Reviewed S&S of PTL  Warning signs reviewed  All questions answered.

## 2021-12-01 ENCOUNTER — TELEPHONE (OUTPATIENT)
Dept: OBGYN CLINIC | Facility: CLINIC | Age: 41
End: 2021-12-01

## 2021-12-01 NOTE — TELEPHONE ENCOUNTER
----- Message from Debbie Martínez CNM sent at 12/1/2021  8:36 AM CST -----  Please call lab and have this lab canceled and charge cancelled. Back on 11/2 I sent a message as the same thing had been dome and HCG from early pregnancy was drawn.  All of t

## 2021-12-01 NOTE — TELEPHONE ENCOUNTER
Spoke w/ lab. Unable to run cbc on specimen collected. Apparently one quant order had not been canceled although none remain open. Pt notified & will come back for redraw. Lmtcb w/ Steve Boston to have acct credited.     Routed to [facility]

## 2021-12-14 ENCOUNTER — HOSPITAL ENCOUNTER (OUTPATIENT)
Facility: HOSPITAL | Age: 41
Setting detail: OBSERVATION
Discharge: HOME | End: 2021-12-14
Attending: ADVANCED PRACTICE MIDWIFE | Admitting: OBSTETRICS & GYNECOLOGY
Payer: COMMERCIAL

## 2021-12-14 ENCOUNTER — ROUTINE PRENATAL (OUTPATIENT)
Dept: OBGYN CLINIC | Facility: CLINIC | Age: 41
End: 2021-12-14
Payer: COMMERCIAL

## 2021-12-14 VITALS
DIASTOLIC BLOOD PRESSURE: 72 MMHG | HEART RATE: 63 BPM | WEIGHT: 152 LBS | SYSTOLIC BLOOD PRESSURE: 105 MMHG | BODY MASS INDEX: 25 KG/M2

## 2021-12-14 VITALS — DIASTOLIC BLOOD PRESSURE: 67 MMHG | SYSTOLIC BLOOD PRESSURE: 117 MMHG | HEART RATE: 71 BPM

## 2021-12-14 DIAGNOSIS — Z34.83 ENCOUNTER FOR SUPERVISION OF OTHER NORMAL PREGNANCY IN THIRD TRIMESTER: Primary | ICD-10-CM

## 2021-12-14 DIAGNOSIS — O09.523 AMA (ADVANCED MATERNAL AGE) MULTIGRAVIDA 35+, THIRD TRIMESTER: ICD-10-CM

## 2021-12-14 PROBLEM — Z34.90 PREGNANCY: Status: ACTIVE | Noted: 2021-12-14

## 2021-12-14 PROBLEM — Z34.90 PREGNANCY (HCC): Status: ACTIVE | Noted: 2021-12-14

## 2021-12-14 PROCEDURE — 3074F SYST BP LT 130 MM HG: CPT | Performed by: ADVANCED PRACTICE MIDWIFE

## 2021-12-14 PROCEDURE — 81002 URINALYSIS NONAUTO W/O SCOPE: CPT | Performed by: ADVANCED PRACTICE MIDWIFE

## 2021-12-14 PROCEDURE — 59025 FETAL NON-STRESS TEST: CPT | Performed by: ADVANCED PRACTICE MIDWIFE

## 2021-12-14 PROCEDURE — 3078F DIAST BP <80 MM HG: CPT | Performed by: ADVANCED PRACTICE MIDWIFE

## 2021-12-15 NOTE — TRIAGE
[facility]      Triage Note    Liz Kirkland Patient Status:  Observation    10/16/1980 MRN [id number]   Location [address] Attending Zaria Pena, [address] Day # 0 PCP Alicia Barahona Para: Stimulator: No           Nonstress Test Interpretation: Reactive           Nonstress Test Second Interpretation: Reactive          FHR Category: Category I             Additional Comments Comments: Pt given discharge instructions on kick counts, declines w

## 2021-12-15 NOTE — PROGRESS NOTES
Pt is a 46year old female admitted to TR3/TR3-A. Patient presents with:  Non-stress Test: sent from office for low baseline     Pt is  34w4d intra-uterine pregnancy. History obtained, consents signed. Oriented to room, staff, and plan of care.

## 2021-12-15 NOTE — PROGRESS NOTES
Baby active. Getting more uncomfortable but no signs of PTL. Warning signs reviewed. Had covid booster. GBS + in urine.

## 2021-12-28 ENCOUNTER — LAB ENCOUNTER (OUTPATIENT)
Dept: LAB | Facility: HOSPITAL | Age: 41
End: 2021-12-28
Attending: ADVANCED PRACTICE MIDWIFE
Payer: COMMERCIAL

## 2021-12-28 DIAGNOSIS — D69.6 TEMPORARY LOW PLATELET COUNT (HCC): ICD-10-CM

## 2021-12-28 LAB
DEPRECATED RDW RBC AUTO: 43.3 FL (ref 35.1–46.3)
ERYTHROCYTE [DISTWIDTH] IN BLOOD BY AUTOMATED COUNT: 12 % (ref 11–15)
HCT VFR BLD AUTO: 35.4 %
HGB BLD-MCNC: 11.9 G/DL
MCH RBC QN AUTO: 32.8 PG (ref 26–34)
MCHC RBC AUTO-ENTMCNC: 33.6 G/DL (ref 31–37)
MCV RBC AUTO: 97.5 FL
PLATELET # BLD AUTO: 143 10(3)UL (ref 150–450)
RBC # BLD AUTO: 3.63 X10(6)UL
WBC # BLD AUTO: 9.6 X10(3) UL (ref 4–11)

## 2021-12-28 PROCEDURE — 85027 COMPLETE CBC AUTOMATED: CPT

## 2021-12-28 PROCEDURE — 36415 COLL VENOUS BLD VENIPUNCTURE: CPT

## 2021-12-30 ENCOUNTER — ROUTINE PRENATAL (OUTPATIENT)
Dept: OBGYN CLINIC | Facility: CLINIC | Age: 41
End: 2021-12-30
Payer: COMMERCIAL

## 2021-12-30 ENCOUNTER — HOSPITAL ENCOUNTER (OUTPATIENT)
Dept: PERINATAL CARE | Facility: HOSPITAL | Age: 41
Discharge: HOME | End: 2021-12-30
Attending: ADVANCED PRACTICE MIDWIFE
Payer: COMMERCIAL

## 2021-12-30 VITALS
SYSTOLIC BLOOD PRESSURE: 104 MMHG | DIASTOLIC BLOOD PRESSURE: 69 MMHG | HEART RATE: 64 BPM | WEIGHT: 153 LBS | BODY MASS INDEX: 25 KG/M2

## 2021-12-30 DIAGNOSIS — O09.529 AMA (ADVANCED MATERNAL AGE) MULTIGRAVIDA 35+: Primary | ICD-10-CM

## 2021-12-30 DIAGNOSIS — Z34.83 ENCOUNTER FOR SUPERVISION OF OTHER NORMAL PREGNANCY IN THIRD TRIMESTER: Primary | ICD-10-CM

## 2021-12-30 PROCEDURE — 59025 FETAL NON-STRESS TEST: CPT | Performed by: ADVANCED PRACTICE MIDWIFE

## 2021-12-30 PROCEDURE — 3078F DIAST BP <80 MM HG: CPT | Performed by: ADVANCED PRACTICE MIDWIFE

## 2021-12-30 PROCEDURE — 3074F SYST BP LT 130 MM HG: CPT | Performed by: ADVANCED PRACTICE MIDWIFE

## 2021-12-30 PROCEDURE — 59025 FETAL NON-STRESS TEST: CPT

## 2021-12-30 PROCEDURE — 81002 URINALYSIS NONAUTO W/O SCOPE: CPT | Performed by: ADVANCED PRACTICE MIDWIFE

## 2021-12-30 NOTE — ADDENDUM NOTE
Encounter addended by: Shalonda Ledebtter RN on: 12/30/2021 10:19 AM   Actions taken: Flowsheet accepted

## 2022-01-06 ENCOUNTER — NST DOCUMENTATION (OUTPATIENT)
Dept: OBGYN CLINIC | Facility: CLINIC | Age: 42
End: 2022-01-06

## 2022-01-06 ENCOUNTER — HOSPITAL ENCOUNTER (OUTPATIENT)
Dept: PERINATAL CARE | Facility: HOSPITAL | Age: 42
Discharge: HOME | End: 2022-01-06
Attending: ADVANCED PRACTICE MIDWIFE
Payer: COMMERCIAL

## 2022-01-06 ENCOUNTER — ROUTINE PRENATAL (OUTPATIENT)
Dept: OBGYN CLINIC | Facility: CLINIC | Age: 42
End: 2022-01-06
Payer: COMMERCIAL

## 2022-01-06 VITALS
HEART RATE: 79 BPM | BODY MASS INDEX: 25 KG/M2 | DIASTOLIC BLOOD PRESSURE: 77 MMHG | SYSTOLIC BLOOD PRESSURE: 113 MMHG | WEIGHT: 155.38 LBS

## 2022-01-06 DIAGNOSIS — Z34.83 ENCOUNTER FOR SUPERVISION OF OTHER NORMAL PREGNANCY IN THIRD TRIMESTER: Primary | ICD-10-CM

## 2022-01-06 DIAGNOSIS — O09.529 AMA (ADVANCED MATERNAL AGE) MULTIGRAVIDA 35+: Primary | ICD-10-CM

## 2022-01-06 LAB
BILIRUBIN: NEGATIVE
GLUCOSE (URINE DIPSTICK): NEGATIVE MG/DL
KETONES (URINE DIPSTICK): NEGATIVE MG/DL
MULTISTIX LOT#: ABNORMAL NUMERIC
NITRITE, URINE: NEGATIVE
OCCULT BLOOD: NEGATIVE
PH, URINE: 7 (ref 4.5–8)
PROTEIN (URINE DIPSTICK): NEGATIVE MG/DL
SPECIFIC GRAVITY: 1.01 (ref 1–1.03)
URINE-COLOR: YELLOW
UROBILINOGEN,SEMI-QN: 0.2 MG/DL (ref 0–1.9)

## 2022-01-06 PROCEDURE — 3074F SYST BP LT 130 MM HG: CPT | Performed by: ADVANCED PRACTICE MIDWIFE

## 2022-01-06 PROCEDURE — 59025 FETAL NON-STRESS TEST: CPT

## 2022-01-06 PROCEDURE — 3078F DIAST BP <80 MM HG: CPT | Performed by: ADVANCED PRACTICE MIDWIFE

## 2022-01-06 PROCEDURE — 81002 URINALYSIS NONAUTO W/O SCOPE: CPT | Performed by: ADVANCED PRACTICE MIDWIFE

## 2022-01-06 PROCEDURE — 59025 FETAL NON-STRESS TEST: CPT | Performed by: ADVANCED PRACTICE MIDWIFE

## 2022-01-06 NOTE — NST
Nonstress Test   Patient: Cristofer Thomas    Gestation: 37w6d    Diagnosis from order:   AMA       NST: Reactive NST on 12/30/21         NST DOCUMENTATION 12/30/2021   Variability 6-25 BPM   Accelerations Yes   Decelerations None   Baseline 120   Uterine Ir

## 2022-01-07 NOTE — NST
Nonstress Test   Patient: Denise Fuchs    Gestation: 37w6d    Diagnosis from order:  Advanced maternal age       NST:         NST DOCUMENTATION 1/6/2022   Variability 6-25 BPM   Accelerations Yes   Decelerations None   Baseline 120   Uterine Irritability

## 2022-01-13 ENCOUNTER — NST DOCUMENTATION (OUTPATIENT)
Dept: OBGYN CLINIC | Facility: CLINIC | Age: 42
End: 2022-01-13

## 2022-01-13 ENCOUNTER — HOSPITAL ENCOUNTER (OUTPATIENT)
Dept: PERINATAL CARE | Facility: HOSPITAL | Age: 42
Discharge: HOME | End: 2022-01-13
Attending: ADVANCED PRACTICE MIDWIFE
Payer: COMMERCIAL

## 2022-01-13 ENCOUNTER — ROUTINE PRENATAL (OUTPATIENT)
Dept: OBGYN CLINIC | Facility: CLINIC | Age: 42
End: 2022-01-13
Payer: COMMERCIAL

## 2022-01-13 VITALS
BODY MASS INDEX: 25 KG/M2 | HEART RATE: 76 BPM | WEIGHT: 154.63 LBS | DIASTOLIC BLOOD PRESSURE: 72 MMHG | SYSTOLIC BLOOD PRESSURE: 119 MMHG

## 2022-01-13 DIAGNOSIS — O09.529 AMA (ADVANCED MATERNAL AGE) MULTIGRAVIDA 35+: Primary | ICD-10-CM

## 2022-01-13 DIAGNOSIS — Z34.83 ENCOUNTER FOR SUPERVISION OF OTHER NORMAL PREGNANCY IN THIRD TRIMESTER: Primary | ICD-10-CM

## 2022-01-13 DIAGNOSIS — O09.529 ANTEPARTUM MULTIGRAVIDA OF ADVANCED MATERNAL AGE: ICD-10-CM

## 2022-01-13 LAB
APPEARANCE: CLEAR
BILIRUBIN: NEGATIVE
GLUCOSE (URINE DIPSTICK): NEGATIVE MG/DL
KETONES (URINE DIPSTICK): NEGATIVE MG/DL
MULTISTIX LOT#: ABNORMAL NUMERIC
NITRITE, URINE: NEGATIVE
OCCULT BLOOD: NEGATIVE
PH, URINE: 8 (ref 4.5–8)
PROTEIN (URINE DIPSTICK): NEGATIVE MG/DL
SPECIFIC GRAVITY: 1.01 (ref 1–1.03)
URINE-COLOR: YELLOW
UROBILINOGEN,SEMI-QN: 0.2 MG/DL (ref 0–1.9)

## 2022-01-13 PROCEDURE — 59025 FETAL NON-STRESS TEST: CPT

## 2022-01-13 PROCEDURE — 3074F SYST BP LT 130 MM HG: CPT | Performed by: ADVANCED PRACTICE MIDWIFE

## 2022-01-13 PROCEDURE — 81002 URINALYSIS NONAUTO W/O SCOPE: CPT | Performed by: ADVANCED PRACTICE MIDWIFE

## 2022-01-13 PROCEDURE — 59025 FETAL NON-STRESS TEST: CPT | Performed by: ADVANCED PRACTICE MIDWIFE

## 2022-01-13 PROCEDURE — 3078F DIAST BP <80 MM HG: CPT | Performed by: ADVANCED PRACTICE MIDWIFE

## 2022-01-13 NOTE — NST
Nonstress Test   Patient: Osmel Shah    Gestation: 38w6d    Diagnosis from order:  AMA       NST: Reactive         NST DOCUMENTATION 1/13/2022   Variability 6-25 BPM   Accelerations Yes   Decelerations None   Baseline 120   Uterine Irritability No   Co

## 2022-01-17 ENCOUNTER — HOSPITAL ENCOUNTER (INPATIENT)
Facility: HOSPITAL | Age: 42
LOS: 2 days | Discharge: HOME | End: 2022-01-19
Attending: ADVANCED PRACTICE MIDWIFE | Admitting: OBSTETRICS & GYNECOLOGY
Payer: COMMERCIAL

## 2022-01-17 PROBLEM — O42.90 AMNIOTIC FLUID LEAKING (HCC): Status: ACTIVE | Noted: 2022-01-17

## 2022-01-17 PROBLEM — O42.90 AMNIOTIC FLUID LEAKING: Status: ACTIVE | Noted: 2022-01-17

## 2022-01-17 LAB
ANTIBODY SCREEN: NEGATIVE
BASOPHILS # BLD AUTO: 0.04 X10(3) UL (ref 0–0.2)
BASOPHILS NFR BLD AUTO: 0.4 %
DEPRECATED RDW RBC AUTO: 43.1 FL (ref 35.1–46.3)
EOSINOPHIL # BLD AUTO: 0.05 X10(3) UL (ref 0–0.7)
EOSINOPHIL NFR BLD AUTO: 0.5 %
ERYTHROCYTE [DISTWIDTH] IN BLOOD BY AUTOMATED COUNT: 11.9 % (ref 11–15)
HCT VFR BLD AUTO: 37.5 %
HGB BLD-MCNC: 12.5 G/DL
IMM GRANULOCYTES # BLD AUTO: 0.05 X10(3) UL (ref 0–1)
IMM GRANULOCYTES NFR BLD: 0.5 %
LYMPHOCYTES # BLD AUTO: 2.3 X10(3) UL (ref 1–4)
LYMPHOCYTES NFR BLD AUTO: 24.9 %
MCH RBC QN AUTO: 32.4 PG (ref 26–34)
MCHC RBC AUTO-ENTMCNC: 33.3 G/DL (ref 31–37)
MCV RBC AUTO: 97.2 FL
MONOCYTES # BLD AUTO: 0.73 X10(3) UL (ref 0.1–1)
MONOCYTES NFR BLD AUTO: 7.9 %
NEUTROPHILS # BLD AUTO: 6.07 X10 (3) UL (ref 1.5–7.7)
NEUTROPHILS # BLD AUTO: 6.07 X10(3) UL (ref 1.5–7.7)
NEUTROPHILS NFR BLD AUTO: 65.8 %
PLATELET # BLD AUTO: 141 10(3)UL (ref 150–450)
RBC # BLD AUTO: 3.86 X10(6)UL
RH BLOOD TYPE: POSITIVE
SARS-COV-2 RNA RESP QL NAA+PROBE: NOT DETECTED
WBC # BLD AUTO: 9.2 X10(3) UL (ref 4–11)

## 2022-01-17 PROCEDURE — 10907ZC DRAINAGE OF AMNIOTIC FLUID, THERAPEUTIC FROM PRODUCTS OF CONCEPTION, VIA NATURAL OR ARTIFICIAL OPENING: ICD-10-PCS | Performed by: ADVANCED PRACTICE MIDWIFE

## 2022-01-17 PROCEDURE — 59400 OBSTETRICAL CARE: CPT | Performed by: ADVANCED PRACTICE MIDWIFE

## 2022-01-17 RX ORDER — DOCUSATE SODIUM 100 MG/1
100 CAPSULE, LIQUID FILLED ORAL
Status: DISCONTINUED | OUTPATIENT
Start: 2022-01-17 | End: 2022-01-19

## 2022-01-17 RX ORDER — LIDOCAINE HYDROCHLORIDE 10 MG/ML
30 INJECTION, SOLUTION EPIDURAL; INFILTRATION; INTRACAUDAL; PERINEURAL ONCE
Status: DISCONTINUED | OUTPATIENT
Start: 2022-01-17 | End: 2022-01-17 | Stop reason: HOSPADM

## 2022-01-17 RX ORDER — DIAPER,BRIEF,INFANT-TODD,DISP
1 EACH MISCELLANEOUS EVERY 6 HOURS PRN
Status: DISCONTINUED | OUTPATIENT
Start: 2022-01-17 | End: 2022-01-19

## 2022-01-17 RX ORDER — ACETAMINOPHEN 325 MG/1
650 TABLET ORAL EVERY 6 HOURS PRN
Status: DISCONTINUED | OUTPATIENT
Start: 2022-01-17 | End: 2022-01-19

## 2022-01-17 RX ORDER — ONDANSETRON 2 MG/ML
4 INJECTION INTRAMUSCULAR; INTRAVENOUS EVERY 6 HOURS PRN
Status: DISCONTINUED | OUTPATIENT
Start: 2022-01-17 | End: 2022-01-19

## 2022-01-17 RX ORDER — TERBUTALINE SULFATE 1 MG/ML
0.25 INJECTION, SOLUTION SUBCUTANEOUS AS NEEDED
Status: DISCONTINUED | OUTPATIENT
Start: 2022-01-17 | End: 2022-01-17 | Stop reason: HOSPADM

## 2022-01-17 RX ORDER — IBUPROFEN 600 MG/1
600 TABLET ORAL EVERY 6 HOURS PRN
Status: DISCONTINUED | OUTPATIENT
Start: 2022-01-17 | End: 2022-01-17 | Stop reason: HOSPADM

## 2022-01-17 RX ORDER — ACETAMINOPHEN 500 MG
500 TABLET ORAL EVERY 6 HOURS PRN
Status: DISCONTINUED | OUTPATIENT
Start: 2022-01-17 | End: 2022-01-17 | Stop reason: HOSPADM

## 2022-01-17 RX ORDER — IBUPROFEN 600 MG/1
600 TABLET ORAL EVERY 6 HOURS
Status: DISCONTINUED | OUTPATIENT
Start: 2022-01-17 | End: 2022-01-19

## 2022-01-17 RX ORDER — AMMONIA INHALANTS 0.04 G/.3ML
0.3 INHALANT RESPIRATORY (INHALATION) AS NEEDED
Status: DISCONTINUED | OUTPATIENT
Start: 2022-01-17 | End: 2022-01-19

## 2022-01-17 RX ORDER — AMMONIA INHALANTS 0.04 G/.3ML
0.3 INHALANT RESPIRATORY (INHALATION) AS NEEDED
Status: DISCONTINUED | OUTPATIENT
Start: 2022-01-17 | End: 2022-01-17 | Stop reason: HOSPADM

## 2022-01-17 RX ORDER — CHOLECALCIFEROL (VITAMIN D3) 25 MCG
1 TABLET,CHEWABLE ORAL DAILY
Status: DISCONTINUED | OUTPATIENT
Start: 2022-01-17 | End: 2022-01-19

## 2022-01-17 RX ORDER — SIMETHICONE 80 MG
80 TABLET,CHEWABLE ORAL 3 TIMES DAILY PRN
Status: DISCONTINUED | OUTPATIENT
Start: 2022-01-17 | End: 2022-01-19

## 2022-01-17 RX ORDER — CALCIUM CARBONATE 200(500)MG
1000 TABLET,CHEWABLE ORAL 2 TIMES DAILY PRN
Status: DISCONTINUED | OUTPATIENT
Start: 2022-01-17 | End: 2022-01-17

## 2022-01-17 RX ORDER — ONDANSETRON 2 MG/ML
4 INJECTION INTRAMUSCULAR; INTRAVENOUS EVERY 6 HOURS PRN
Status: DISCONTINUED | OUTPATIENT
Start: 2022-01-17 | End: 2022-01-17 | Stop reason: HOSPADM

## 2022-01-17 RX ORDER — TRISODIUM CITRATE DIHYDRATE AND CITRIC ACID MONOHYDRATE 500; 334 MG/5ML; MG/5ML
30 SOLUTION ORAL AS NEEDED
Status: DISCONTINUED | OUTPATIENT
Start: 2022-01-17 | End: 2022-01-17 | Stop reason: HOSPADM

## 2022-01-17 RX ORDER — SODIUM CHLORIDE, SODIUM LACTATE, POTASSIUM CHLORIDE, CALCIUM CHLORIDE 600; 310; 30; 20 MG/100ML; MG/100ML; MG/100ML; MG/100ML
INJECTION, SOLUTION INTRAVENOUS CONTINUOUS
Status: DISCONTINUED | OUTPATIENT
Start: 2022-01-17 | End: 2022-01-17 | Stop reason: HOSPADM

## 2022-01-17 RX ORDER — BISACODYL 10 MG
10 SUPPOSITORY, RECTAL RECTAL ONCE AS NEEDED
Status: DISCONTINUED | OUTPATIENT
Start: 2022-01-17 | End: 2022-01-19

## 2022-01-17 NOTE — H&P
[facility]    History & Physical    Smith Hugo Patient Status:  Inpatient    10/16/1980 MRN [id number]   Location [address] [facility] Attending Erum Macias, [address] [facility] Day # 0 Dominique Conrad MD surgery     Family History:   Family History   Problem Relation Age of Onset   • Heart Disorder Mother    • Diabetes Mother    • Heart Attack Mother    • Kidney Disease Mother    • Other (Other) Mother         Ankylosing Spondylitis   • Kidney Disease Mate RRR  Abdomen: soft, non-tender, EFW 7 1/2#, presentation vertex, uterine contractions irregular   Fetal Surveillance:  115 BPM; Fetal heart variability: moderate  Fetal Heart Rate decelerations: none  Fetal Heart Rate accelerations: yes  Pelvis: Gynecoid 1417    Vitamin D              8-20 Weeks     Test Value Reference Range Date Time    1st Trimester Aneuploidy Risk Assessment        Quad - Down Screen Risk Estimate - prior to 02/20/18        Quad - Down Maternal Age Risk - prior to 02/20/18        [employer] OE to answer)        Cystic Fibrosis[165] (Required questions in OE to answer)        Sickle Cell        24Hr Urine Protein        24Hr Urine Creatinine        Parvo B19 IgM        Parvo B19 IgG              Legend    ^: Historical                        R

## 2022-01-17 NOTE — PROGRESS NOTES
Pt is a 46year old female admitted to TR1/TR1-A. Patient presents with:  R/o Labor     Pt is  39w3d intra-uterine pregnancy. History obtained, consents signed. Oriented to room, staff, and plan of care.

## 2022-01-17 NOTE — PROGRESS NOTES
· Nitrous Oxide self-administration education provided by: Enoch Aguilar RN  · Provider instructed patient and support person on the use of nitrous oxide use for pain management and self-administration policy  · Consent obtained  · Equipment set up don

## 2022-01-17 NOTE — PROGRESS NOTES
[facility]    Labor Progress Note    Perry Torres Patient Status:  Inpatient    10/16/1980 MRN [id number]   Location [address] [place] [address] Attending Toño Garcia, [address] [facility] Day # 0  [facility]

## 2022-01-17 NOTE — PROGRESS NOTES
[facility]    Labor Progress Note    Issacamerico Barakatclaude Patient Status:  Inpatient    10/16/1980 MRN [id number]   Location [address] [place] [address] Attending Johan Nix, [address] [facility] Day # 0  [facility] Assessment & Plan:     AMA (advanced maternal age) multigravida 33+  -Normal Level 2 &  testing      GBS bacteriuria  -2nd dose Antibiotics now      Temporary low platelet count (HCC)  -Plt stable @ 141 on admission      Pregnancy  -Continue

## 2022-01-17 NOTE — PROGRESS NOTES
[facility]    Labor Progress Note    Mona Vaca Patient Status:  Inpatient    10/16/1980 MRN [id number]   Location [address] [place] [address] Attending Dania Knight, [address] [facility] Day # 0  [facility] Negative 01/13/2022    UROBILINOGEN 2.0 (A) 06/16/2017    LEUUR Negative 06/16/2017    UASA 40  (A) 06/16/2017       Assessment & Plan:     Pregnancy  Joaquim Pace is 46year old, Q6G8198, with current EGA of 39w3d, here for labor management  Category 1 FHR tracin

## 2022-01-17 NOTE — PROGRESS NOTES
Pt is a 46year old female admitted to [address] [place] [address]. Patient presents with:  R/o Labor     Pt is [id number] 39w3d intra-uterine pregnancy. History obtained, consents signed. Oriented to room, staff, and plan of care.

## 2022-01-18 LAB
BASOPHILS # BLD AUTO: 0.05 X10(3) UL (ref 0–0.2)
BASOPHILS NFR BLD AUTO: 0.5 %
DEPRECATED RDW RBC AUTO: 41.9 FL (ref 35.1–46.3)
EOSINOPHIL # BLD AUTO: 0.06 X10(3) UL (ref 0–0.7)
EOSINOPHIL NFR BLD AUTO: 0.6 %
ERYTHROCYTE [DISTWIDTH] IN BLOOD BY AUTOMATED COUNT: 11.8 % (ref 11–15)
HCT VFR BLD AUTO: 32.1 %
HGB BLD-MCNC: 11 G/DL
IMM GRANULOCYTES # BLD AUTO: 0.05 X10(3) UL (ref 0–1)
IMM GRANULOCYTES NFR BLD: 0.5 %
LYMPHOCYTES # BLD AUTO: 1.82 X10(3) UL (ref 1–4)
LYMPHOCYTES NFR BLD AUTO: 16.8 %
MCH RBC QN AUTO: 33.1 PG (ref 26–34)
MCHC RBC AUTO-ENTMCNC: 34.3 G/DL (ref 31–37)
MCV RBC AUTO: 96.7 FL
MONOCYTES # BLD AUTO: 0.71 X10(3) UL (ref 0.1–1)
MONOCYTES NFR BLD AUTO: 6.6 %
NEUTROPHILS # BLD AUTO: 8.14 X10 (3) UL (ref 1.5–7.7)
NEUTROPHILS # BLD AUTO: 8.14 X10(3) UL (ref 1.5–7.7)
NEUTROPHILS NFR BLD AUTO: 75 %
PLATELET # BLD AUTO: 130 10(3)UL (ref 150–450)
RBC # BLD AUTO: 3.32 X10(6)UL
WBC # BLD AUTO: 10.8 X10(3) UL (ref 4–11)

## 2022-01-18 NOTE — PROGRESS NOTES
[place] [facility]    OB/GYNE Progress Note      Audrey Mendoza Patient Status:  Inpatient    10/16/1980 MRN [id number]   Location [facility] 3SE Attending Rajiv Avila, [address] Day # 1 PCP Dong Pedersen        Assessment/Plan No evidence of DVT on exam  Psychiatric: Calm affect, appropriate     DVT Risk Score: Low risk        Results:     Lab Results   Component Value Date    TREPONEMALAB Negative 10/30/2021    HBVSAG Nonreactive  07/09/2018    ABO O 01/17/2022    RH Positive 0

## 2022-01-18 NOTE — PROGRESS NOTES
[facility]    Labor Progress Note    Deshaunerika Ivel Patient Status:  Inpatient    10/16/1980 MRN [id number]   Location [address] [facility] Attending Fatimah Bustamante, [address] [facility] Day # 0  [facility]

## 2022-01-18 NOTE — PROGRESS NOTES
[facility]    Vaginal Delivery Note    Audrey Mendoza Patient Status:  Inpatient    10/16/1980 MRN [id number]   Location [address] Attending Rajiv Avila, [address] [facility] Day # 0 AMIE Marmolejo

## 2022-01-18 NOTE — PROGRESS NOTES
Patient received into room 355 via wheelchair. Beside report received from [facility]. Patient transferred to bed without incident. Bed in locked and low position. Side rails up x 2. VSS. Fundus firm at u/2, lochia small, no clots noted. IV intact.  Pain le

## 2022-01-18 NOTE — PROGRESS NOTES
Patient up to bathroom with assist x 2. Voided 400mL. Patient transferred to mother/baby room 355 per wheelchair in stable condition with baby and personal belongings. Accompanied by staff. Report given to Brittani Gary mother/baby RN.

## 2022-01-19 VITALS
HEART RATE: 67 BPM | TEMPERATURE: 98 F | OXYGEN SATURATION: 100 % | DIASTOLIC BLOOD PRESSURE: 68 MMHG | SYSTOLIC BLOOD PRESSURE: 114 MMHG | RESPIRATION RATE: 16 BRPM

## 2022-01-19 NOTE — DISCHARGE SUMMARY
[place] [facility] - [facility]    Discharge Summary    [place] Patient Status:  Inpatient    10/16/1980 MRN [id number]   Location [facility] 3SE Attending Kari Moya, [address] Day # 2       Delivering OB Clinician: Dillon Pineda

## 2022-01-19 NOTE — PROGRESS NOTES
[place] [facility] - [facility]    OB/GYNE Progress Note      Dilshad Shelton Patient Status:  Inpatient    10/16/1980 MRN [id number]   Location [facility] 3SE Attending Ji Berrios, [address] Day # 2 PCP Tona López        Assessment/Plan TREPONEMALAB Negative 10/30/2021    HBVSAG Nonreactive  07/09/2018    ABO O 01/17/2022    RH Positive 01/17/2022    WBC 10.8 01/18/2022    HGB 11.0 (L) 01/18/2022    HCT 32.1 (L) 01/18/2022    .0 (L) 01/18/2022    CREATSERUM 0.79 01/24/2019    BUN 9

## 2022-01-26 NOTE — L&D DELIVERY NOTE
Christine Hudson [[id number]]    Labor Events     labor?: No   steroids?: None  Antibiotics received during labor?: Yes  Antibiotics (enter # doses in comment): ampicillin  Rupture date/time: 2022 0751     Rupture type: AROM  Fluid colo withdrawal    Muscle tone Limp Some flexion Active motion    Respiratory effort Absent Weak cry; hypoventilation Good, crying              1 Minute:  5 Minute:  10 Minute:  15 Minute:  20 Minute:    Skin color: 1  1       Heart rate: 2  2       Reflex irri to complete dilatation and +1 station prior to pushing successfully to viable baby boy. See Delivery Summary above for time, APGARs, and weight. Sweep for nuchal cord was performed and no nuchal cord identified.  Anterior shoulder delivered spontaneously wi

## 2022-02-01 NOTE — ADDENDUM NOTE
Addended by: Sam Heredia on: 6/16/2021 12:58 PM     Modules accepted: Orders [Normal] : no acute distress, well nourished, well developed and well-appearing

## 2022-02-02 ENCOUNTER — TELEMEDICINE (OUTPATIENT)
Dept: OBGYN CLINIC | Facility: CLINIC | Age: 42
End: 2022-02-02

## 2022-02-02 DIAGNOSIS — Z30.8 ENCOUNTER FOR OTHER CONTRACEPTIVE MANAGEMENT: Primary | ICD-10-CM

## 2022-02-02 DIAGNOSIS — Z13.31 DEPRESSION SCREENING: ICD-10-CM

## 2022-02-08 ENCOUNTER — TELEPHONE (OUTPATIENT)
Dept: OBGYN UNIT | Facility: HOSPITAL | Age: 42
End: 2022-02-08

## 2022-02-13 NOTE — TELEPHONE ENCOUNTER
LMTCB per Familia Pierce CNM patient is due for a 1 year follow up pap Pt to ED via EMS for eval. Pt got into an argument with his grandmother and sent her a txt stating [place] the train [place] and he was going to kill himself. Currently pt is calm and cooperative. Pt states he did txt her those things but he did not mean them. He states he was just angry.

## 2022-03-02 ENCOUNTER — POSTPARTUM (OUTPATIENT)
Dept: OBGYN CLINIC | Facility: CLINIC | Age: 42
End: 2022-03-02
Payer: COMMERCIAL

## 2022-03-02 VITALS
HEART RATE: 67 BPM | DIASTOLIC BLOOD PRESSURE: 77 MMHG | WEIGHT: 138 LBS | BODY MASS INDEX: 22 KG/M2 | SYSTOLIC BLOOD PRESSURE: 109 MMHG

## 2022-03-02 DIAGNOSIS — N81.84 PELVIC MUSCLE ATROPHY: ICD-10-CM

## 2022-03-02 DIAGNOSIS — G89.29 CHRONIC LOW BACK PAIN WITHOUT SCIATICA, UNSPECIFIED BACK PAIN LATERALITY: ICD-10-CM

## 2022-03-02 DIAGNOSIS — M54.50 CHRONIC LOW BACK PAIN WITHOUT SCIATICA, UNSPECIFIED BACK PAIN LATERALITY: ICD-10-CM

## 2022-03-02 PROCEDURE — 3074F SYST BP LT 130 MM HG: CPT | Performed by: ADVANCED PRACTICE MIDWIFE

## 2022-03-02 PROCEDURE — 3078F DIAST BP <80 MM HG: CPT | Performed by: ADVANCED PRACTICE MIDWIFE

## 2022-03-03 NOTE — PROGRESS NOTES
Patient here for postpartum check-up. Vaginal delivery @ 6 weeks ago with CNM. Breastfeeding exclusively, on demand. Baby with adequate weight gain. Denies fevers, chills, body aches and flu-like symptoms. Denies abdominal pain, no pelvic pain, reports no vaginal bleeding. Bleeding stopped 1 week ago     Denies dysuria, urinary frequency and urinary incontinence. Denies constipation, painful bowel movements and fecal incontinence. Denies symptoms of postpartum depression including mood, affect, appetite / activity level changes. Has adequate support at home. Perineum concerns:none  Considering IUD for [facility] method    O:   General: well groomed, Alert and oriented x 3    Neck: supple, no nodes, euthyroid  Lungs: normal effort  Breasts: bilaterally lactating, no masses, no skin redness, nipples intact with no trauma    Abdomen: non-tender, no masses, no hernia,  : perineum intact, introitus normal, vaginal walls pink, physiologic discharge; cervix intact no lesions,  uterus non-tender, normal size, no adnexal masses or tenderness; Vaginal floor weakness. With valsalva first degree uterine prolapse  Psych: pleasant, normal affect      A: Normal PP involution      Breastfeeding   Pelvic floor muscle wasting    P: Continue to breastfeed exclusively, continue PNV while breastfeeding and for preconception. Pelvic floor PT - Vitality     Considering IUD for BC method.   GORMAN for now understands risk of pregnancy  Follow-up with routine annual exam]

## 2022-03-31 ENCOUNTER — TELEPHONE (OUTPATIENT)
Dept: OBGYN CLINIC | Facility: CLINIC | Age: 42
End: 2022-03-31

## 2022-05-02 ENCOUNTER — TELEMEDICINE (OUTPATIENT)
Dept: INTERNAL MEDICINE CLINIC | Facility: CLINIC | Age: 42
End: 2022-05-02

## 2022-05-02 DIAGNOSIS — J06.9 VIRAL UPPER RESPIRATORY TRACT INFECTION: Primary | ICD-10-CM

## 2022-05-02 RX ORDER — AMOXICILLIN 500 MG/1
500 CAPSULE ORAL 3 TIMES DAILY
Qty: 21 CAPSULE | Refills: 0 | Status: SHIPPED | OUTPATIENT
Start: 2022-05-02 | End: 2022-05-09

## 2022-05-02 NOTE — ASSESSMENT & PLAN NOTE
53-year-old female with a 1month-old I am meeting for the first time per video visit. Her 1year-old came ho  me with a cough and runny nose. They were tested for COVID and negative. She now has been battling upper respiratory symptoms cough, nasal congestion, sore throat, chest tightness with her cough and left ear pain. She currently is breast-feeding her 1month-old. amoxicillin 500 MG Oral Cap Take 1 capsule (500 mg total) by mouth 3 (three) times daily for 7 days. 21 capsule   Teas and honey for throat pain.

## 2022-06-20 NOTE — ADDENDUM NOTE
Addended by: Josse Stratton on: 1/4/2019 03:31 PM     Modules accepted: Orders
Addended by: Sree Barahona on: 1/4/2019 03:17 PM     Modules accepted: Orders
Patent

## 2022-09-14 ENCOUNTER — TELEPHONE (OUTPATIENT)
Dept: OBGYN CLINIC | Facility: CLINIC | Age: 42
End: 2022-09-14

## 2022-11-04 ENCOUNTER — IMMUNIZATION (OUTPATIENT)
Dept: LAB | Age: 42
End: 2022-11-04
Attending: EMERGENCY MEDICINE
Payer: COMMERCIAL

## 2022-11-04 DIAGNOSIS — Z23 NEED FOR VACCINATION: Primary | ICD-10-CM

## 2022-11-04 PROCEDURE — 90471 IMMUNIZATION ADMIN: CPT

## 2022-11-04 PROCEDURE — 90686 IIV4 VACC NO PRSV 0.5 ML IM: CPT

## 2022-12-19 ENCOUNTER — TELEMEDICINE (OUTPATIENT)
Dept: INTERNAL MEDICINE CLINIC | Facility: CLINIC | Age: 42
End: 2022-12-19

## 2022-12-19 DIAGNOSIS — H92.03 PAIN, EAR, BILATERAL: Primary | ICD-10-CM

## 2022-12-19 PROCEDURE — 99212 OFFICE O/P EST SF 10 MIN: CPT | Performed by: NURSE PRACTITIONER

## 2022-12-19 NOTE — ASSESSMENT & PLAN NOTE
Patient with bilateral ear pain. She says she has drainage from both ears.     Plan  Patient instructed to come into the office for ear exam

## 2022-12-20 ENCOUNTER — OFFICE VISIT (OUTPATIENT)
Dept: INTERNAL MEDICINE CLINIC | Facility: CLINIC | Age: 42
End: 2022-12-20
Payer: COMMERCIAL

## 2022-12-20 ENCOUNTER — LAB ENCOUNTER (OUTPATIENT)
Dept: LAB | Age: 42
End: 2022-12-20
Attending: NURSE PRACTITIONER
Payer: COMMERCIAL

## 2022-12-20 VITALS
WEIGHT: 121.19 LBS | DIASTOLIC BLOOD PRESSURE: 70 MMHG | HEIGHT: 65.8 IN | TEMPERATURE: 98 F | SYSTOLIC BLOOD PRESSURE: 115 MMHG | BODY MASS INDEX: 19.71 KG/M2 | HEART RATE: 78 BPM

## 2022-12-20 DIAGNOSIS — J06.9 VIRAL UPPER RESPIRATORY TRACT INFECTION: ICD-10-CM

## 2022-12-20 DIAGNOSIS — Z00.00 ANNUAL PHYSICAL EXAM: ICD-10-CM

## 2022-12-20 DIAGNOSIS — Z12.31 ENCOUNTER FOR SCREENING MAMMOGRAM FOR MALIGNANT NEOPLASM OF BREAST: Primary | ICD-10-CM

## 2022-12-20 LAB
ALBUMIN SERPL-MCNC: 4.2 G/DL (ref 3.4–5)
ALBUMIN/GLOB SERPL: 1.3 {RATIO} (ref 1–2)
ALP LIVER SERPL-CCNC: 68 U/L
ALT SERPL-CCNC: 31 U/L
ANION GAP SERPL CALC-SCNC: 9 MMOL/L (ref 0–18)
AST SERPL-CCNC: 18 U/L (ref 15–37)
BASOPHILS # BLD AUTO: 0.04 X10(3) UL (ref 0–0.2)
BASOPHILS NFR BLD AUTO: 0.7 %
BILIRUB SERPL-MCNC: 0.5 MG/DL (ref 0.1–2)
BILIRUB UR QL: NEGATIVE
BUN BLD-MCNC: 12 MG/DL (ref 7–18)
BUN/CREAT SERPL: 13.3 (ref 10–20)
CALCIUM BLD-MCNC: 9.7 MG/DL (ref 8.5–10.1)
CHLORIDE SERPL-SCNC: 105 MMOL/L (ref 98–112)
CHOLEST SERPL-MCNC: 155 MG/DL (ref ?–200)
CLARITY UR: CLEAR
CO2 SERPL-SCNC: 28 MMOL/L (ref 21–32)
COLOR UR: YELLOW
CREAT BLD-MCNC: 0.9 MG/DL
DEPRECATED RDW RBC AUTO: 39.4 FL (ref 35.1–46.3)
EOSINOPHIL # BLD AUTO: 0.09 X10(3) UL (ref 0–0.7)
EOSINOPHIL NFR BLD AUTO: 1.5 %
ERYTHROCYTE [DISTWIDTH] IN BLOOD BY AUTOMATED COUNT: 11.2 % (ref 11–15)
FASTING PATIENT LIPID ANSWER: NO
FASTING STATUS PATIENT QL REPORTED: NO
GFR SERPLBLD BASED ON 1.73 SQ M-ARVRAT: 83 ML/MIN/1.73M2 (ref 60–?)
GLOBULIN PLAS-MCNC: 3.3 G/DL (ref 2.8–4.4)
GLUCOSE BLD-MCNC: 77 MG/DL (ref 70–99)
GLUCOSE UR-MCNC: NEGATIVE MG/DL
HCT VFR BLD AUTO: 43.3 %
HDLC SERPL-MCNC: 83 MG/DL (ref 40–59)
HGB BLD-MCNC: 14.2 G/DL
HGB UR QL STRIP.AUTO: NEGATIVE
IMM GRANULOCYTES # BLD AUTO: 0.01 X10(3) UL (ref 0–1)
IMM GRANULOCYTES NFR BLD: 0.2 %
KETONES UR-MCNC: NEGATIVE MG/DL
LDLC SERPL CALC-MCNC: 62 MG/DL (ref ?–100)
LEUKOCYTE ESTERASE UR QL STRIP.AUTO: NEGATIVE
LYMPHOCYTES # BLD AUTO: 1.67 X10(3) UL (ref 1–4)
LYMPHOCYTES NFR BLD AUTO: 28.4 %
MCH RBC QN AUTO: 31.3 PG (ref 26–34)
MCHC RBC AUTO-ENTMCNC: 32.8 G/DL (ref 31–37)
MCV RBC AUTO: 95.4 FL
MONOCYTES # BLD AUTO: 0.55 X10(3) UL (ref 0.1–1)
MONOCYTES NFR BLD AUTO: 9.4 %
NEUTROPHILS # BLD AUTO: 3.51 X10 (3) UL (ref 1.5–7.7)
NEUTROPHILS # BLD AUTO: 3.51 X10(3) UL (ref 1.5–7.7)
NEUTROPHILS NFR BLD AUTO: 59.8 %
NITRITE UR QL STRIP.AUTO: NEGATIVE
NONHDLC SERPL-MCNC: 72 MG/DL (ref ?–130)
OSMOLALITY SERPL CALC.SUM OF ELEC: 293 MOSM/KG (ref 275–295)
PH UR: 7.5 [PH] (ref 5–8)
PLATELET # BLD AUTO: 215 10(3)UL (ref 150–450)
POTASSIUM SERPL-SCNC: 3.7 MMOL/L (ref 3.5–5.1)
PROT SERPL-MCNC: 7.5 G/DL (ref 6.4–8.2)
PROT UR-MCNC: NEGATIVE MG/DL
RBC # BLD AUTO: 4.54 X10(6)UL
SODIUM SERPL-SCNC: 142 MMOL/L (ref 136–145)
SP GR UR STRIP: 1.02 (ref 1–1.03)
TRIGL SERPL-MCNC: 43 MG/DL (ref 30–149)
TSI SER-ACNC: 1.38 MIU/ML (ref 0.36–3.74)
UROBILINOGEN UR STRIP-ACNC: 0.2
VIT B12 SERPL-MCNC: 1395 PG/ML (ref 193–986)
VIT D+METAB SERPL-MCNC: 46.4 NG/ML (ref 30–100)
VLDLC SERPL CALC-MCNC: 6 MG/DL (ref 0–30)
WBC # BLD AUTO: 5.9 X10(3) UL (ref 4–11)

## 2022-12-20 PROCEDURE — 84443 ASSAY THYROID STIM HORMONE: CPT

## 2022-12-20 PROCEDURE — 82306 VITAMIN D 25 HYDROXY: CPT

## 2022-12-20 PROCEDURE — 85025 COMPLETE CBC W/AUTO DIFF WBC: CPT

## 2022-12-20 PROCEDURE — 3078F DIAST BP <80 MM HG: CPT | Performed by: NURSE PRACTITIONER

## 2022-12-20 PROCEDURE — 80061 LIPID PANEL: CPT

## 2022-12-20 PROCEDURE — 3008F BODY MASS INDEX DOCD: CPT | Performed by: NURSE PRACTITIONER

## 2022-12-20 PROCEDURE — 80053 COMPREHEN METABOLIC PANEL: CPT

## 2022-12-20 PROCEDURE — 82607 VITAMIN B-12: CPT

## 2022-12-20 PROCEDURE — 81003 URINALYSIS AUTO W/O SCOPE: CPT

## 2022-12-20 PROCEDURE — 99213 OFFICE O/P EST LOW 20 MIN: CPT | Performed by: NURSE PRACTITIONER

## 2022-12-20 PROCEDURE — 3074F SYST BP LT 130 MM HG: CPT | Performed by: NURSE PRACTITIONER

## 2022-12-20 PROCEDURE — 36415 COLL VENOUS BLD VENIPUNCTURE: CPT

## 2022-12-20 RX ORDER — AZITHROMYCIN 250 MG/1
TABLET, FILM COATED ORAL
Qty: 6 TABLET | Refills: 0 | Status: SHIPPED | OUTPATIENT
Start: 2022-12-20 | End: 2022-12-25

## 2022-12-20 NOTE — ASSESSMENT & PLAN NOTE
URI- With bilateral ear pain. Bulging TM without erythema    Plan      Plan  Hydrate with fluids  Tylenol two tabs every six hours. . Not to exceed 4 grams in one day. Drink Hot tea with lemon  Drink Hot tea with honey  Gargle with warm salt water if you have a sore throat.   Abdirashid Celestin

## 2022-12-20 NOTE — PATIENT INSTRUCTIONS
Warm Compresses to nasal passages. Heating pad to ears twice a day for 15 to 20 minutes.     Shivam Zimmerman

## 2023-01-05 ENCOUNTER — OFFICE VISIT (OUTPATIENT)
Dept: INTERNAL MEDICINE CLINIC | Facility: CLINIC | Age: 43
End: 2023-01-05
Payer: COMMERCIAL

## 2023-01-05 VITALS
HEIGHT: 65.8 IN | OXYGEN SATURATION: 100 % | WEIGHT: 120.81 LBS | DIASTOLIC BLOOD PRESSURE: 60 MMHG | BODY MASS INDEX: 19.65 KG/M2 | HEART RATE: 65 BPM | SYSTOLIC BLOOD PRESSURE: 98 MMHG

## 2023-01-05 DIAGNOSIS — H92.12 OTORRHEA OF LEFT EAR: Primary | ICD-10-CM

## 2023-01-05 DIAGNOSIS — H92.03 PAIN, EAR, BILATERAL: Chronic | ICD-10-CM

## 2023-01-05 DIAGNOSIS — Z00.00 ANNUAL PHYSICAL EXAM: ICD-10-CM

## 2023-01-05 PROBLEM — Z12.31 ENCOUNTER FOR SCREENING MAMMOGRAM FOR MALIGNANT NEOPLASM OF BREAST: Status: ACTIVE | Noted: 2023-01-05

## 2023-01-05 PROCEDURE — 99396 PREV VISIT EST AGE 40-64: CPT | Performed by: NURSE PRACTITIONER

## 2023-01-05 PROCEDURE — 3074F SYST BP LT 130 MM HG: CPT | Performed by: NURSE PRACTITIONER

## 2023-01-05 PROCEDURE — 3078F DIAST BP <80 MM HG: CPT | Performed by: NURSE PRACTITIONER

## 2023-01-05 PROCEDURE — 3008F BODY MASS INDEX DOCD: CPT | Performed by: NURSE PRACTITIONER

## 2023-01-05 NOTE — ASSESSMENT & PLAN NOTE
Normal exam.  Labs as ordered. Skin check normal.  No significant abnormal nevi. Breast exam completed-no palpable abnormalities, discharge from the nipples or axillary adenopathy. No cervical or inguinal lymphadenopathy. Hernial orifices intact. Immunizations- Up to date.

## 2023-01-05 NOTE — ASSESSMENT & PLAN NOTE
Patient with bilateral ear pain. She says she has drainage from left year. Pain has improved with ZPAK. She continues with drainage from her left ear. No hearing loss.     Plan  Referral to ENT

## 2023-05-09 NOTE — LETTER
[facility]  Administration of Anesthesia  1. Pati Strauss, or _________________________________ acting on her behalf, (Patient) (Dependent/Representative) request to receive anesthesia for my pending procedure/operation/treatment.   A bleeding, seizure, cardiac arrest and death. 7. AWARENESS: I understand that it is possible (but unlikely) to have explicit memory of events from the operating room while under general anesthesia.   8. ELECTROCONVULSIVE THERAPY PATIENTS: This consent serve below affirms that prior to the time of the procedure, I have explained to the patient and/or his/her guardian, the risks and benefits of undergoing anesthesia, as well as any reasonable alternatives.     ___________________________________________________ No

## 2023-08-23 NOTE — DISCHARGE SUMMARY
[place] [facility] - [facility]    Discharge Summary    Wendy Joaquin Patient Status:  Inpatient    10/16/1980 MRN [id number]   Location [facility] 3SE Attending Ephraim Ashraf, [address] Day # 2       Delivering OB Clinician: Micheal Gray Terbinafine Counseling: Patient counseling regarding adverse effects of terbinafine including but not limited to headache, diarrhea, rash, upset stomach, liver function test abnormalities, itching, taste/smell disturbance, nausea, abdominal pain, and flatulence.  There is a rare possibility of liver failure that can occur when taking terbinafine.  The patient understands that a baseline LFT and kidney function test may be required. The patient verbalized understanding of the proper use and possible adverse effects of terbinafine.  All of the patient's questions and concerns were addressed.

## 2023-09-20 ENCOUNTER — HOSPITAL ENCOUNTER (OUTPATIENT)
Dept: MAMMOGRAPHY | Age: 43
Discharge: HOME | End: 2023-09-20
Attending: NURSE PRACTITIONER
Payer: COMMERCIAL

## 2023-09-20 DIAGNOSIS — Z12.31 ENCOUNTER FOR SCREENING MAMMOGRAM FOR MALIGNANT NEOPLASM OF BREAST: ICD-10-CM

## 2023-09-20 DIAGNOSIS — Z00.00 ANNUAL PHYSICAL EXAM: ICD-10-CM

## 2023-09-20 PROCEDURE — 77063 BREAST TOMOSYNTHESIS BI: CPT | Performed by: NURSE PRACTITIONER

## 2023-09-20 PROCEDURE — 77067 SCR MAMMO BI INCL CAD: CPT | Performed by: NURSE PRACTITIONER

## 2024-01-26 ENCOUNTER — OFFICE VISIT (OUTPATIENT)
Dept: OBGYN CLINIC | Facility: CLINIC | Age: 44
End: 2024-01-26

## 2024-01-26 VITALS
DIASTOLIC BLOOD PRESSURE: 63 MMHG | WEIGHT: 126 LBS | SYSTOLIC BLOOD PRESSURE: 101 MMHG | HEIGHT: 66.5 IN | HEART RATE: 86 BPM | BODY MASS INDEX: 20.01 KG/M2

## 2024-01-26 DIAGNOSIS — Z12.4 PAP SMEAR FOR CERVICAL CANCER SCREENING: ICD-10-CM

## 2024-01-26 DIAGNOSIS — Z01.419 WOMEN'S ANNUAL ROUTINE GYNECOLOGICAL EXAMINATION: Primary | ICD-10-CM

## 2024-01-26 DIAGNOSIS — Z12.31 SCREENING MAMMOGRAM FOR BREAST CANCER: ICD-10-CM

## 2024-01-26 PROBLEM — H92.03: Chronic | Status: RESOLVED | Noted: 2022-12-19 | Resolved: 2024-01-26

## 2024-01-26 PROBLEM — Z00.00 ANNUAL PHYSICAL EXAM: Status: RESOLVED | Noted: 2023-01-05 | Resolved: 2024-01-26

## 2024-01-26 PROBLEM — D69.6 TEMPORARY LOW PLATELET COUNT (HCC): Status: RESOLVED | Noted: 2021-11-02 | Resolved: 2024-01-26

## 2024-01-26 PROBLEM — J06.9 VIRAL UPPER RESPIRATORY TRACT INFECTION: Status: RESOLVED | Noted: 2022-05-02 | Resolved: 2024-01-26

## 2024-01-26 PROBLEM — D69.6 TEMPORARY LOW PLATELET COUNT: Status: RESOLVED | Noted: 2021-11-02 | Resolved: 2024-01-26

## 2024-01-26 PROCEDURE — 3074F SYST BP LT 130 MM HG: CPT | Performed by: ADVANCED PRACTICE MIDWIFE

## 2024-01-26 PROCEDURE — 3078F DIAST BP <80 MM HG: CPT | Performed by: ADVANCED PRACTICE MIDWIFE

## 2024-01-26 PROCEDURE — 3008F BODY MASS INDEX DOCD: CPT | Performed by: ADVANCED PRACTICE MIDWIFE

## 2024-01-26 PROCEDURE — 99396 PREV VISIT EST AGE 40-64: CPT | Performed by: ADVANCED PRACTICE MIDWIFE

## 2024-01-26 NOTE — PROGRESS NOTES
Paris De Jesus is a 43 year old female.    HPI:       Paris De Jesus is a 43 year old female.   Patient's last menstrual period was 2024 (approximate).    Chief Complaint   Patient presents with    Annual     Last pap  normal , last mammogram  ,      Patient presents for annual exam. Patient reports being healthy. Reports not being on birth control and unsure about future pregnancy plans. LMP 24. Reports periods being regular lasting 4-6 days, light with one heavy day. Reports having PMS symptoms 2 days prior to start of period but these symptoms are not effecting her activities of everyday life. Reports being a monogamous relationship with male partner. Last mammogram was in  and patient was told she has dense breast tissue but it was WNL. Patient reporting sun spot on lower leg that is of some concern.     Relationship: monogamous male partner  Contraception:none  Abuse: denies  Diet: well balanced  Exercise: regular   Denies excessive ETOH use, no marijuana, smoking, vapping or any non prescriptive drug use.   Family hx of breast or ovarian CA: none       Hx Prior Abnormal Pap: Yes  Pap Date: 17  Pap Result Notes: ASCUS, HPV neg    Last pap  WNL    Menarche: 16  Period Cycle (Days): monthly  Period Duration (Days): 3-4 days  Period Flow: light  Use of Birth Control (if yes, specify type): None  Hx Prior Abnormal Pap: Yes  Pap Date: 17  Pap Result Notes: ASCUS, HPV neg      HISTORY:  Past Medical History:   Diagnosis Date    Amenorrhea     No menses for 4-6 months    Ankylosing spondylitis (HCC)     Annual physical exam     Dysmenorrhea     Since start of menses    Encounter for screening mammogram for malignant neoplasm of breast     History of chicken pox age 4    Human papilloma virus infection     Pt has a Hx of abnormal paps.  Last pap done 14  LGSIL  HPV Positive    Pain, ear, bilateral     Temporary low platelet count (HCC)     149 @ 28 wks. Repeat 1  mn_______________    Viral upper respiratory tract infection       Past Surgical History:   Procedure Laterality Date    COLPOSCOPY, CERVIX W/UPPER ADJACENT VAGINA; W/BIOPSY(S), CERVIX  05/05/2010    5/5/10 JACQUELINE 1    OTHER SURGICAL HISTORY  7/2014    Sinus surgery      Family History   Problem Relation Age of Onset    Heart Disorder Mother     Diabetes Mother     Heart Attack Mother     Kidney Disease Mother     Other (Other) Mother         Ankylosing Spondylitis    Kidney Disease Maternal Grandmother     Other (Other) Maternal Grandmother         Ankylosing Spondyltis    Dementia Father     No Known Problems Daughter     No Known Problems Son     Heart Attack Maternal Grandfather     Other (Other) Paternal Grandmother         maculardegeneration      Social History:   Social History     Socioeconomic History    Marital status:      Spouse name: Calixto De Jesus    Number of children: 2    Years of education: 18    Highest education level: Master's degree (e.g., MA, MS, Mariaelena, MEd, MSW, DAVID)   Occupational History    Occupation: Stay at home mom   Tobacco Use    Smoking status: Never    Smokeless tobacco: Never   Vaping Use    Vaping Use: Never used   Substance and Sexual Activity    Alcohol use: Not Currently     Alcohol/week: 3.0 standard drinks of alcohol     Types: 3 Glasses of wine per week     Comment: prior to pregnancy 2 glasses weekly     Drug use: No   Other Topics Concern     Service No    Blood Transfusions No    Caffeine Concern Yes     Comment: 1 cup daily    Occupational Exposure No    Stress Concern Yes     Comment: death of mother    Weight Concern No    Special Diet No     Comment: No fish    Exercise Yes     Comment: chasing children    Bike Helmet Yes    Seat Belt Yes        Medications (Active prior to today's visit):  Current Outpatient Medications   Medication Sig Dispense Refill    Ascorbic Acid (VITAMIN C) 100 MG Oral Tab Take 1 tablet (100 mg total) by mouth daily.       Cholecalciferol (VITAMIN D) 1000 UNITS Oral Cap Take  by mouth.      CALCIUM OR Take 1,200 mg by mouth daily. (Patient not taking: Reported on 1/26/2024)      PRENATAL MULTI +DHA (PNV WITH DHA) 27-0.8-228 MG Oral Cap Take 1 capsule by mouth daily. (Patient not taking: Reported on 1/26/2024)         Allergies:  No Known Allergies      ROS:     Review of Systems   Constitutional: Negative.    HENT: Negative.     Eyes: Negative.    Respiratory: Negative.     Cardiovascular: Negative.    Gastrointestinal: Negative.    Endocrine: Negative.    Genitourinary: Negative.    Musculoskeletal: Negative.    Allergic/Immunologic: Negative.    Neurological: Negative.    Hematological: Negative.    Psychiatric/Behavioral: Negative.           PHYSICAL EXAM:     Vitals:    01/26/24 0920   BP: 101/63   Pulse: 86     Physical Exam  Vitals reviewed.   Constitutional:       Appearance: Normal appearance. She is normal weight.   Pulmonary:      Effort: Pulmonary effort is normal.   Chest:      Chest wall: No mass, lacerations, deformity, swelling, tenderness or edema.   Breasts:     Right: Normal. No swelling, bleeding, inverted nipple, mass, nipple discharge, skin change or tenderness.      Left: Normal. No swelling, bleeding, inverted nipple, mass, nipple discharge, skin change or tenderness.   Genitourinary:     General: Normal vulva.      Exam position: Lithotomy position.      Pubic Area: No rash or pubic lice.       Labia:         Right: No rash, tenderness, lesion or injury.         Left: No rash, tenderness, lesion or injury.       Vagina: Normal.      Cervix: Eversion (ectropion cervix) present. No discharge, lesion or cervical bleeding.   Lymphadenopathy:      Upper Body:      Right upper body: No supraclavicular, axillary or pectoral adenopathy.      Left upper body: No supraclavicular, axillary or pectoral adenopathy.   Skin:     General: Skin is warm and dry.      Findings: Rash present. Rash is papular (papular spot on left  lower leg).   Neurological:      General: No focal deficit present.      Mental Status: She is alert.      Motor: Motor function is intact.      Coordination: Coordination is intact.   Psychiatric:         Attention and Perception: Attention and perception normal.         Mood and Affect: Mood and affect normal.         Speech: Speech normal.         Behavior: Behavior normal. Behavior is cooperative.         Thought Content: Thought content normal.         Cognition and Memory: Cognition normal.          ASSESSMENT/PLAN:      Diagnoses and all orders for this visit:    Women's annual routine gynecological examination    Pap smear for cervical cancer screening  -     Cancel: ThinPrep PAP Smear; Future  -     Cancel: Hpv Dna  High Risk , Thin Prep Collect; Future    Screening mammogram for breast cancer  -     [facility] KRIS 2D+3D SCREENING BILAT (CPT=77067/87678); Future        Reviewed PAP guidelines recommending q 5 year screening with HPV testing.   Discussed breast awareness and SBE, and recommendations regarding mammography.  Order provided for screening mammogram.  Discussed weight management and regular exercise  Discussed Calcium and Vitamin D to maintain bone density  Discussed need for contraception until menopause, counseled on IUDs, and patient declined contraception today.  Offered patient STI screening and she declines  Recommended seeing dermatologist for papule on lower left leg    Follow up in 1 year or as need for gynecological concerns    Assessment and plan completed by Nida STEVENS, under the direct supervision of Zunilda Yang CNM       1/26/2024  Zunilda Yang CNM

## 2024-01-31 ENCOUNTER — OFFICE VISIT (OUTPATIENT)
Dept: INTERNAL MEDICINE CLINIC | Facility: CLINIC | Age: 44
End: 2024-01-31

## 2024-01-31 VITALS
BODY MASS INDEX: 19.85 KG/M2 | HEART RATE: 52 BPM | HEIGHT: 66.5 IN | WEIGHT: 125 LBS | TEMPERATURE: 97 F | DIASTOLIC BLOOD PRESSURE: 66 MMHG | SYSTOLIC BLOOD PRESSURE: 101 MMHG

## 2024-01-31 DIAGNOSIS — H66.002 NON-RECURRENT ACUTE SUPPURATIVE OTITIS MEDIA OF LEFT EAR WITHOUT SPONTANEOUS RUPTURE OF TYMPANIC MEMBRANE: ICD-10-CM

## 2024-01-31 DIAGNOSIS — D22.9 ATYPICAL NEVI: ICD-10-CM

## 2024-01-31 DIAGNOSIS — Z12.83 SCREENING FOR SKIN CANCER: Primary | ICD-10-CM

## 2024-01-31 PROCEDURE — 3074F SYST BP LT 130 MM HG: CPT | Performed by: NURSE PRACTITIONER

## 2024-01-31 PROCEDURE — 3008F BODY MASS INDEX DOCD: CPT | Performed by: NURSE PRACTITIONER

## 2024-01-31 PROCEDURE — 99214 OFFICE O/P EST MOD 30 MIN: CPT | Performed by: NURSE PRACTITIONER

## 2024-01-31 PROCEDURE — 3078F DIAST BP <80 MM HG: CPT | Performed by: NURSE PRACTITIONER

## 2024-01-31 RX ORDER — AMOXICILLIN AND CLAVULANATE POTASSIUM 875; 125 MG/1; MG/1
1 TABLET, FILM COATED ORAL 2 TIMES DAILY
Qty: 20 TABLET | Refills: 0 | Status: SHIPPED | OUTPATIENT
Start: 2024-01-31 | End: 2024-02-10

## 2024-01-31 NOTE — PROGRESS NOTES
HPI:    Patient ID: Paris De Jesus is a 43 year old female.    HPI Left ear pain.  43 year old female complaining of left ear pain.  I last saw patient a year ago for bilateral ear pain.  Today she presents with severe left ear pain   Pain level 10/10.  She took tylenol with no relief..    Atypical Nevi  Noticed change in mole on her left thigh.  Photo taken- [employer].    Immunization History   Administered Date(s) Administered    Covid-19 Vaccine Moderna 50 Mcg/0.25 Ml 12/04/2021    Covid-19 Vaccine Pfizer 30 mcg/0.3 ml 03/19/2021, 04/16/2021    FLULAVAL 6 months & older 0.5 ml Prefilled syringe (25689) 10/11/2018, 10/06/2020, 10/08/2021    FLUZONE 6 months and older PFS 0.5 ml (07596) 10/06/2015, 11/04/2022    TDAP 10/06/2015, 11/20/2018, 11/02/2021       Past Medical History:   Diagnosis Date    Amenorrhea 2004    No menses for 4-6 months    Ankylosing spondylitis (HCC)     Annual physical exam     Dysmenorrhea     Since start of menses    Encounter for screening mammogram for malignant neoplasm of breast     History of chicken pox age 4    Human papilloma virus infection     Pt has a Hx of abnormal paps.  Last pap done 7/24/14  LGSIL  HPV Positive    Pain, ear, bilateral     Temporary low platelet count (HCC)     149 @ 28 wks. Repeat 1 mn_______________    Viral upper respiratory tract infection       Past Surgical History:   Procedure Laterality Date    Colposcopy, cervix w/upper adjacent vagina; w/biopsy(s), cervix  05/05/2010    5/5/10 JACQUELINE 1    Other surgical history  7/2014    Sinus surgery      Social History     Socioeconomic History    Marital status:      Spouse name: Calixto De Jesus    Number of children: 2    Years of education: 18    Highest education level: Master's degree (e.g., MA, MS, Mariaelena, MEd, MSW, DAVID)   Occupational History    Occupation: Stay at home mom   Tobacco Use    Smoking status: Never    Smokeless tobacco: Never   Vaping Use    Vaping Use: Never used   Substance and Sexual  Activity    Alcohol use: Not Currently     Alcohol/week: 3.0 standard drinks of alcohol     Types: 3 Glasses of wine per week     Comment: prior to pregnancy 2 glasses weekly     Drug use: No   Other Topics Concern     Service No    Blood Transfusions No    Caffeine Concern Yes     Comment: 1 cup daily    Occupational Exposure No    Stress Concern Yes     Comment: death of mother    Weight Concern No    Special Diet No     Comment: No fish    Exercise Yes     Comment: chasing children    Bike Helmet Yes    Seat Belt Yes          Review of Systems   Constitutional:  Positive for fatigue. Negative for chills and fever.   HENT:  Positive for ear pain and sore throat. Negative for hearing loss, sinus pain and trouble swallowing.    Eyes:  Negative for pain and visual disturbance.   Respiratory:  Positive for cough. Negative for chest tightness and shortness of breath.    Cardiovascular:  Negative for chest pain, palpitations and leg swelling.   Gastrointestinal:  Negative for abdominal pain, constipation, diarrhea, nausea and vomiting.   Endocrine: Negative for cold intolerance and heat intolerance.   Genitourinary:  Negative for dysuria and hematuria.   Musculoskeletal:  Negative for back pain and joint swelling.   Skin:  Negative for rash.   Allergic/Immunologic: Negative for environmental allergies.   Neurological:  Negative for weakness, numbness and headaches.   Hematological:  Does not bruise/bleed easily.   Psychiatric/Behavioral:  Positive for sleep disturbance. Negative for agitation and dysphoric mood. The patient is not nervous/anxious.               Current Outpatient Medications   Medication Sig Dispense Refill    amoxicillin clavulanate 875-125 MG Oral Tab Take 1 tablet by mouth 2 (two) times daily for 10 days. 20 tablet 0    Ascorbic Acid (VITAMIN C) 100 MG Oral Tab Take 1 tablet (100 mg total) by mouth daily.      Cholecalciferol (VITAMIN D) 1000 UNITS Oral Cap Take  by mouth.      CALCIUM OR  Take 1,200 mg by mouth daily. (Patient not taking: Reported on 1/26/2024)      PRENATAL MULTI +DHA (PNV WITH DHA) 27-0.8-228 MG Oral Cap Take 1 capsule by mouth daily. (Patient not taking: Reported on 1/26/2024)       Allergies:No Known Allergies   PHYSICAL EXAM:   Physical Exam  Constitutional:       Appearance: Normal appearance. She is well-developed.   HENT:      Head: Normocephalic.      Right Ear: Tympanic membrane normal.      Ears:      Comments: Acute otitis media of left ear.  Left ear with bulging, opaque, erythematous               Nose: Nose normal.      Mouth/Throat:      Mouth: Mucous membranes are moist.      Pharynx: No oropharyngeal exudate or posterior oropharyngeal erythema.   Eyes:      General:         Right eye: No discharge.         Left eye: No discharge.      Pupils: Pupils are equal, round, and reactive to light.   Cardiovascular:      Rate and Rhythm: Normal rate and regular rhythm.      Heart sounds: Normal heart sounds. No murmur heard.     No friction rub. No gallop.   Pulmonary:      Effort: Pulmonary effort is normal. No respiratory distress.      Breath sounds: Normal breath sounds. No wheezing, rhonchi or rales.   Abdominal:      General: Bowel sounds are normal. There is no distension.      Palpations: Abdomen is soft. There is no mass.      Tenderness: There is no abdominal tenderness. There is no right CVA tenderness, left CVA tenderness or guarding.   Musculoskeletal:         General: No tenderness.      Cervical back: Normal range of motion and neck supple. No tenderness.      Right lower leg: No edema.      Left lower leg: No edema.   Lymphadenopathy:      Cervical: No cervical adenopathy.   Skin:     General: Skin is warm and dry.      Findings: No rash.   Neurological:      Mental Status: She is alert and oriented to person, place, and time.      Coordination: Coordination normal.      Gait: Gait normal.   Psychiatric:         Mood and Affect: Mood normal.         Behavior:  Behavior normal.         Thought Content: Thought content normal.         Judgment: Judgment normal.       /66 (BP Location: Right arm, Patient Position: Sitting, Cuff Size: adult)   Pulse 52   Temp 97.3 °F (36.3 °C) (Oral)   Ht 5' 6.5\" (1.689 m)   Wt 125 lb (56.7 kg)   LMP 01/16/2024 (Approximate)   BMI 19.87 kg/m²   Wt Readings from Last 2 Encounters:   01/31/24 125 lb (56.7 kg)   01/26/24 126 lb (57.2 kg)     Body mass index is 19.87 kg/m².(2)  Lab Results   Component Value Date    WBC 5.9 12/20/2022    RBC 4.54 12/20/2022    HGB 14.2 12/20/2022    HCT 43.3 12/20/2022    MCV 95.4 12/20/2022    MCH 31.3 12/20/2022    MCHC 32.8 12/20/2022    RDW 11.2 12/20/2022    .0 12/20/2022    MPV 8.8 01/24/2019      Lab Results   Component Value Date    GLU 77 12/20/2022    BUN 12 12/20/2022    BUNCREA 13.3 12/20/2022    CREATSERUM 0.90 12/20/2022    ANIONGAP 9 12/20/2022    GFRNAA >60 01/24/2019    GFRAA >60 01/24/2019    CA 9.7 12/20/2022    OSMOCALC 293 12/20/2022    ALKPHO 68 12/20/2022    AST 18 12/20/2022    ALT 31 12/20/2022    BILT 0.5 12/20/2022    TP 7.5 12/20/2022    ALB 4.2 12/20/2022    GLOBULIN 3.3 12/20/2022     12/20/2022    K 3.7 12/20/2022     12/20/2022    CO2 28.0 12/20/2022      Lab Results   Component Value Date     07/07/2021    A1C 5.2 07/07/2021      Lab Results   Component Value Date    CHOLEST 155 12/20/2022    TRIG 43 12/20/2022    HDL 83 (H) 12/20/2022    LDL 62 12/20/2022    VLDL 6 12/20/2022    NONHDLC 72 12/20/2022      Lab Results   Component Value Date    TSH 1.380 12/20/2022                ASSESSMENT/PLAN:     Problem List Items Addressed This Visit       Non-recurrent acute suppurative otitis media of left ear without spontaneous rupture of tympanic membrane    Relevant Medications    amoxicillin clavulanate 875-125 MG Oral Tab    Atypical nevi     New skin discoloration, changing shape.  Photo take.    Plan  Refer to Derm          Other Visit Diagnoses        Screening for skin cancer    -  Primary    Relevant Orders    Derm Referral - In [facility]               No orders of the defined types were placed in this encounter.      Meds This Visit:  Requested Prescriptions     Signed Prescriptions Disp Refills    amoxicillin clavulanate 875-125 MG Oral Tab 20 tablet 0     Sig: Take 1 tablet by mouth 2 (two) times daily for 10 days.       Imaging & Referrals:  DERM - INTERNAL         CARLIE Jacobo

## 2024-02-13 ENCOUNTER — PATIENT MESSAGE (OUTPATIENT)
Dept: INTERNAL MEDICINE CLINIC | Facility: CLINIC | Age: 44
End: 2024-02-13

## 2024-02-14 ENCOUNTER — OFFICE VISIT (OUTPATIENT)
Dept: DERMATOLOGY CLINIC | Facility: CLINIC | Age: 44
End: 2024-02-14
Payer: COMMERCIAL

## 2024-02-14 DIAGNOSIS — D18.01 CHERRY ANGIOMA: ICD-10-CM

## 2024-02-14 DIAGNOSIS — L81.4 LENTIGINES: Primary | ICD-10-CM

## 2024-02-14 DIAGNOSIS — D22.9 MULTIPLE BENIGN NEVI: ICD-10-CM

## 2024-02-14 DIAGNOSIS — L82.1 SEBORRHEIC KERATOSIS: ICD-10-CM

## 2024-02-14 PROCEDURE — 99243 OFF/OP CNSLTJ NEW/EST LOW 30: CPT | Performed by: STUDENT IN AN ORGANIZED HEALTH CARE EDUCATION/TRAINING PROGRAM

## 2024-02-14 NOTE — TELEPHONE ENCOUNTER
From: Paris De Jesus  To: Eufemia Stallings  Sent: 2/13/2024 11:08 PM CST  Subject: Paris De Jesus Ear Infection follow up    Good Evening,    I saw you a few weeks ago for an ear infection. You gave me antibiotics for ten days, which I finished last week. I am still having problems with my ear. It is clogged up, causing difficulty in hearing. It still hurts a bit and I am having consistent drainage from that ear. Should I come in again? Or do I need more antibiotics?    Thank you in advance and I look forward to hearing back from you!    Paris De Jesus  [phone]

## 2024-02-20 ENCOUNTER — OFFICE VISIT (OUTPATIENT)
Dept: INTERNAL MEDICINE CLINIC | Facility: CLINIC | Age: 44
End: 2024-02-20

## 2024-02-20 VITALS
BODY MASS INDEX: 19.85 KG/M2 | HEART RATE: 55 BPM | DIASTOLIC BLOOD PRESSURE: 75 MMHG | WEIGHT: 125 LBS | TEMPERATURE: 97 F | HEIGHT: 66.5 IN | SYSTOLIC BLOOD PRESSURE: 115 MMHG

## 2024-02-20 DIAGNOSIS — J02.9 SORE THROAT: ICD-10-CM

## 2024-02-20 DIAGNOSIS — H92.02 LEFT EAR PAIN: Primary | ICD-10-CM

## 2024-02-20 LAB
CONTROL LINE PRESENT WITH A CLEAR BACKGROUND (YES/NO): YES YES/NO
KIT LOT #: NORMAL NUMERIC
STREP GRP A CUL-SCR: NEGATIVE

## 2024-02-20 PROCEDURE — 3008F BODY MASS INDEX DOCD: CPT | Performed by: NURSE PRACTITIONER

## 2024-02-20 PROCEDURE — 87880 STREP A ASSAY W/OPTIC: CPT | Performed by: NURSE PRACTITIONER

## 2024-02-20 PROCEDURE — 3078F DIAST BP <80 MM HG: CPT | Performed by: NURSE PRACTITIONER

## 2024-02-20 PROCEDURE — 3074F SYST BP LT 130 MM HG: CPT | Performed by: NURSE PRACTITIONER

## 2024-02-20 PROCEDURE — 99214 OFFICE O/P EST MOD 30 MIN: CPT | Performed by: NURSE PRACTITIONER

## 2024-02-20 RX ORDER — FLUTICASONE PROPIONATE 50 MCG
2 SPRAY, SUSPENSION (ML) NASAL DAILY
Qty: 16 G | Refills: 0 | Status: SHIPPED | OUTPATIENT
Start: 2024-02-20 | End: 2025-02-14

## 2024-02-20 RX ORDER — CETIRIZINE HYDROCHLORIDE 10 MG/1
1 CAPSULE, LIQUID FILLED ORAL DAILY
Qty: 30 CAPSULE | Refills: 0 | OUTPATIENT
Start: 2024-02-20 | End: 2024-03-21

## 2024-02-20 NOTE — ASSESSMENT & PLAN NOTE
Ear Pain (Left) with erythema- nasal congestion, Sore throat    Plan  Strep - negative  Sent for strep culture   fluticasone propionate 50 MCG/ACT Nasal Suspension 2 sprays by Each Nare route daily. 16 g 0    Cetirizine HCl (ZYRTEC ALLERGY) 10 MG Oral Cap Take 1 tablet by mouth daily. 30 capsule 0       Patient to call if symptoms do not resolve

## 2024-02-20 NOTE — PROGRESS NOTES
HPI:    Patient ID: Paris De Jesus is a 43 year old female.    HPI  Left year pain.  43 year old female with recurrent left ear pain.  She was recently treated with Augmentin.  She felt better then left ear congestion and drainage continues.      Immunization History   Administered Date(s) Administered    Covid-19 Vaccine Moderna 50 Mcg/0.25 Ml 12/04/2021    Covid-19 Vaccine Pfizer 30 mcg/0.3 ml 03/19/2021, 04/16/2021    FLULAVAL 6 months & older 0.5 ml Prefilled syringe (44224) 10/11/2018, 10/06/2020, 10/08/2021    FLUZONE 6 months and older PFS 0.5 ml (15322) 10/06/2015, 11/04/2022    TDAP 10/06/2015, 11/20/2018, 11/02/2021       Past Medical History:   Diagnosis Date    Amenorrhea 2004    No menses for 4-6 months    Ankylosing spondylitis (HCC)     Annual physical exam     Dysmenorrhea     Since start of menses    Encounter for screening mammogram for malignant neoplasm of breast     History of chicken pox age 4    Human papilloma virus infection     Pt has a Hx of abnormal paps.  Last pap done 7/24/14  LGSIL  HPV Positive    Pain, ear, bilateral     Temporary low platelet count (HCC)     149 @ 28 wks. Repeat 1 mn_______________    Viral upper respiratory tract infection       Past Surgical History:   Procedure Laterality Date    Colposcopy, cervix w/upper adjacent vagina; w/biopsy(s), cervix  05/05/2010    5/5/10 JACQUELINE 1    Other surgical history  07/2014    Sinus surgery      Social History     Socioeconomic History    Marital status:      Spouse name: Calixto De Jesus    Number of children: 2    Years of education: 18    Highest education level: Master's degree (e.g., MA, MS, Mariaelena, MEd, MSW, DAVID)   Occupational History    Occupation: Stay at home mom   Tobacco Use    Smoking status: Never    Smokeless tobacco: Never   Vaping Use    Vaping Use: Never used   Substance and Sexual Activity    Alcohol use: Not Currently     Alcohol/week: 3.0 standard drinks of alcohol     Types: 3 Glasses of wine per week      Comment: prior to pregnancy 2 glasses weekly     Drug use: No   Other Topics Concern     Service No    Blood Transfusions No    Caffeine Concern Yes     Comment: 1 cup daily    Occupational Exposure No    Stress Concern Yes     Comment: death of mother    Weight Concern No    Special Diet No     Comment: No fish    Exercise Yes     Comment: chasing children    Bike Helmet Yes    Seat Belt Yes    Grew up on a farm No    History of tanning Yes    Outdoor occupation No    Breast feeding No    Reaction to local anesthetic No    Pt has a pacemaker No    Pt has a defibrillator No          Review of Systems   Constitutional:  Negative for chills, fatigue and fever.   HENT:  Positive for ear pain and sore throat. Negative for hearing loss, sinus pain and trouble swallowing.    Eyes:  Negative for pain and visual disturbance.   Respiratory:  Negative for cough, chest tightness and shortness of breath.    Cardiovascular:  Negative for chest pain, palpitations and leg swelling.   Gastrointestinal:  Negative for abdominal pain, constipation, diarrhea, nausea and vomiting.   Endocrine: Negative for cold intolerance and heat intolerance.   Genitourinary:  Negative for dysuria and hematuria.   Musculoskeletal:  Negative for back pain and joint swelling.   Skin:  Negative for rash.   Allergic/Immunologic: Negative for environmental allergies.   Neurological:  Negative for weakness, numbness and headaches.   Hematological:  Does not bruise/bleed easily.   Psychiatric/Behavioral:  Negative for dysphoric mood and sleep disturbance. The patient is not nervous/anxious.               Current Outpatient Medications   Medication Sig Dispense Refill    fluticasone propionate 50 MCG/ACT Nasal Suspension 2 sprays by Each Nare route daily. 16 g 0    Cetirizine HCl (ZYRTEC ALLERGY) 10 MG Oral Cap Take 1 tablet by mouth daily. 30 capsule 0    neomycin-polymyxin-hydrocortisone 3.5-11182-2 Otic Solution Place 4 drops in ear(s) 4 (four)  times daily. 10 mL 0    Ascorbic Acid (VITAMIN C) 100 MG Oral Tab Take 1 tablet (100 mg total) by mouth daily.      Cholecalciferol (VITAMIN D) 1000 UNITS Oral Cap Take  by mouth.      CALCIUM OR Take 1,200 mg by mouth daily. (Patient not taking: Reported on 1/26/2024)      PRENATAL MULTI +DHA (PNV WITH DHA) 27-0.8-228 MG Oral Cap Take 1 capsule by mouth daily. (Patient not taking: Reported on 1/26/2024)       Allergies:No Known Allergies   PHYSICAL EXAM:   Physical Exam  Constitutional:       Appearance: Normal appearance. She is well-developed.   HENT:      Head: Normocephalic.      Comments: Left ear with Erythema     Right Ear: Tympanic membrane normal.      Nose: Congestion present.   Cardiovascular:      Rate and Rhythm: Normal rate and regular rhythm.      Heart sounds: Normal heart sounds. No murmur heard.     No friction rub. No gallop.   Pulmonary:      Effort: Pulmonary effort is normal. No respiratory distress.      Breath sounds: Normal breath sounds. No wheezing, rhonchi or rales.   Abdominal:      General: Bowel sounds are normal. There is no distension.      Palpations: Abdomen is soft. There is no mass.      Tenderness: There is no abdominal tenderness. There is no right CVA tenderness, left CVA tenderness or guarding.   Musculoskeletal:         General: No tenderness.      Cervical back: Normal range of motion and neck supple. No tenderness.      Right lower leg: No edema.      Left lower leg: No edema.   Lymphadenopathy:      Cervical: No cervical adenopathy.   Skin:     General: Skin is warm and dry.      Findings: No rash.   Neurological:      Mental Status: She is alert and oriented to person, place, and time.      Coordination: Coordination normal.      Gait: Gait normal.   Psychiatric:         Mood and Affect: Mood normal.         Behavior: Behavior normal.         Thought Content: Thought content normal.         Judgment: Judgment normal.       /75 (BP Location: Right arm, Patient  Position: Sitting, Cuff Size: adult)   Pulse 55   Temp 96.5 °F (35.8 °C) (Tympanic)   Ht 5' 6.5\" (1.689 m)   Wt 125 lb (56.7 kg)   LMP 02/13/2024 (Approximate)   BMI 19.87 kg/m²   Wt Readings from Last 2 Encounters:   02/20/24 125 lb (56.7 kg)   01/31/24 125 lb (56.7 kg)     Body mass index is 19.87 kg/m².(2)  Lab Results   Component Value Date    WBC 5.9 12/20/2022    RBC 4.54 12/20/2022    HGB 14.2 12/20/2022    HCT 43.3 12/20/2022    MCV 95.4 12/20/2022    MCH 31.3 12/20/2022    MCHC 32.8 12/20/2022    RDW 11.2 12/20/2022    .0 12/20/2022    MPV 8.8 01/24/2019      Lab Results   Component Value Date    GLU 77 12/20/2022    BUN 12 12/20/2022    BUNCREA 13.3 12/20/2022    CREATSERUM 0.90 12/20/2022    ANIONGAP 9 12/20/2022    GFRNAA >60 01/24/2019    GFRAA >60 01/24/2019    CA 9.7 12/20/2022    OSMOCALC 293 12/20/2022    ALKPHO 68 12/20/2022    AST 18 12/20/2022    ALT 31 12/20/2022    BILT 0.5 12/20/2022    TP 7.5 12/20/2022    ALB 4.2 12/20/2022    GLOBULIN 3.3 12/20/2022     12/20/2022    K 3.7 12/20/2022     12/20/2022    CO2 28.0 12/20/2022      Lab Results   Component Value Date     07/07/2021    A1C 5.2 07/07/2021      Lab Results   Component Value Date    CHOLEST 155 12/20/2022    TRIG 43 12/20/2022    HDL 83 (H) 12/20/2022    LDL 62 12/20/2022    VLDL 6 12/20/2022    NONHDLC 72 12/20/2022      Lab Results   Component Value Date    TSH 1.380 12/20/2022                ASSESSMENT/PLAN:     Problem List Items Addressed This Visit       Left ear pain - Primary     Ear Pain (Left) with erythema- nasal congestion, Sore throat    Plan  Strep - negative  Sent for strep culture   fluticasone propionate 50 MCG/ACT Nasal Suspension 2 sprays by Each Nare route daily. 16 g 0    Cetirizine HCl (ZYRTEC ALLERGY) 10 MG Oral Cap Take 1 tablet by mouth daily. 30 capsule 0     Patient to call if symptoms do not resolve         Relevant Medications    fluticasone propionate 50 MCG/ACT Nasal  Suspension    Cetirizine HCl (ZYRTEC ALLERGY) 10 MG Oral Cap    neomycin-polymyxin-hydrocortisone 3.5-34130-7 Otic Solution     Other Visit Diagnoses       Sore throat        Relevant Medications    fluticasone propionate 50 MCG/ACT Nasal Suspension    Cetirizine HCl (ZYRTEC ALLERGY) 10 MG Oral Cap    neomycin-polymyxin-hydrocortisone 3.5-65595-5 Otic Solution    Other Relevant Orders    POC Rapid Strep [16014] (Completed)    Grp A Strep Cult, Throat               Orders Placed This Encounter   Procedures    POC Rapid Strep [47631]    Grp A Strep Cult, Throat       Meds This Visit:  Requested Prescriptions     Signed Prescriptions Disp Refills    fluticasone propionate 50 MCG/ACT Nasal Suspension 16 g 0     Si sprays by Each Nare route daily.    Cetirizine HCl (ZYRTEC ALLERGY) 10 MG Oral Cap 30 capsule 0     Sig: Take 1 tablet by mouth daily.    neomycin-polymyxin-hydrocortisone 3.5-45754-7 Otic Solution 10 mL 0     Sig: Place 4 drops in ear(s) 4 (four) times daily.       Imaging & Referrals:  None         CARLIE Jacobo

## 2024-02-26 ENCOUNTER — TELEPHONE (OUTPATIENT)
Dept: OBGYN CLINIC | Facility: CLINIC | Age: 44
End: 2024-02-26

## 2024-11-09 ENCOUNTER — IMMUNIZATION (OUTPATIENT)
Dept: FAMILY MEDICINE CLINIC | Facility: CLINIC | Age: 44
End: 2024-11-09
Payer: COMMERCIAL

## 2024-11-09 DIAGNOSIS — Z23 NEED FOR INFLUENZA VACCINATION: Primary | ICD-10-CM

## 2025-03-19 ENCOUNTER — PATIENT MESSAGE (OUTPATIENT)
Dept: INTERNAL MEDICINE CLINIC | Facility: CLINIC | Age: 45
End: 2025-03-19

## 2025-03-19 DIAGNOSIS — Z12.31 ENCOUNTER FOR SCREENING MAMMOGRAM FOR MALIGNANT NEOPLASM OF BREAST: Primary | ICD-10-CM

## 2025-03-20 NOTE — TELEPHONE ENCOUNTER
Last visit 2/20/24    No future appointments.    Unable to place per protocol    Advised patient she is due for physical. Pended for review.

## 2025-04-17 ENCOUNTER — OFFICE VISIT (OUTPATIENT)
Dept: INTERNAL MEDICINE CLINIC | Facility: CLINIC | Age: 45
End: 2025-04-17
Payer: COMMERCIAL

## 2025-04-17 ENCOUNTER — HOSPITAL ENCOUNTER (OUTPATIENT)
Dept: MAMMOGRAPHY | Age: 45
Discharge: HOME | End: 2025-04-17
Attending: NURSE PRACTITIONER
Payer: COMMERCIAL

## 2025-04-17 VITALS
DIASTOLIC BLOOD PRESSURE: 73 MMHG | HEIGHT: 66.5 IN | RESPIRATION RATE: 16 BRPM | HEART RATE: 67 BPM | SYSTOLIC BLOOD PRESSURE: 110 MMHG | WEIGHT: 125.81 LBS | BODY MASS INDEX: 19.98 KG/M2 | OXYGEN SATURATION: 100 % | TEMPERATURE: 97 F

## 2025-04-17 DIAGNOSIS — Z12.31 ENCOUNTER FOR SCREENING MAMMOGRAM FOR MALIGNANT NEOPLASM OF BREAST: ICD-10-CM

## 2025-04-17 DIAGNOSIS — H92.02 LEFT EAR PAIN: Primary | ICD-10-CM

## 2025-04-17 PROCEDURE — 3074F SYST BP LT 130 MM HG: CPT | Performed by: NURSE PRACTITIONER

## 2025-04-17 PROCEDURE — 77063 BREAST TOMOSYNTHESIS BI: CPT | Performed by: NURSE PRACTITIONER

## 2025-04-17 PROCEDURE — 3078F DIAST BP <80 MM HG: CPT | Performed by: NURSE PRACTITIONER

## 2025-04-17 PROCEDURE — 77067 SCR MAMMO BI INCL CAD: CPT | Performed by: NURSE PRACTITIONER

## 2025-04-17 PROCEDURE — 3008F BODY MASS INDEX DOCD: CPT | Performed by: NURSE PRACTITIONER

## 2025-04-17 PROCEDURE — 99213 OFFICE O/P EST LOW 20 MIN: CPT | Performed by: NURSE PRACTITIONER

## 2025-04-17 RX ORDER — FLUTICASONE PROPIONATE 50 MCG
2 SPRAY, SUSPENSION (ML) NASAL DAILY
Qty: 1 EACH | Refills: 0 | Status: SHIPPED | OUTPATIENT
Start: 2025-04-17 | End: 2026-04-12

## 2025-04-17 NOTE — ASSESSMENT & PLAN NOTE
Acute otitis media of left ear.  Left ear with bulging, opaque, erythematous, and a yellowish hue.       Plan  amoxicillin clavulanate 875-125 MG Oral Tab Take 1 tablet by mouth 2 (two) times daily for 10 days. 20 tablet   Apply warm compresses to infected ear     amoxicillin clavulanate 875-125 MG Oral Tab          Take 1 tablet by mouth 2 (two) times daily for 10 days., Normal, Disp-20 tablet, R-0       fluticasone propionate 50 MCG/ACT Nasal Suspension         2 sprays by Each Nare route daily., Normal, Disp-1 each, R-0

## 2025-04-17 NOTE — PROGRESS NOTES
HPI:    Patient ID: Paris De Jesus is a 44 year old female.    HPI Left Ear Pain  44 year old female complaining of left ear pain.  This pain started about five days ago.  Has been progressively worse  She is also having chest congestion.  She is coughing up thick green mucus.  No complaints of hearing loss.    Immunization History   Administered Date(s) Administered    Covid-19 Vaccine Moderna 50 Mcg/0.25 Ml 12/04/2021    Covid-19 Vaccine Pfizer 30 mcg/0.3 ml 03/19/2021, 04/16/2021    FLULAVAL 6 months & older 0.5 ml Prefilled syringe (96072) 10/11/2018, 10/06/2020, 10/08/2021    FLUZONE 6 months and older PFS 0.5 ml (51525) 10/06/2015, 11/04/2022    Influenza Vaccine, trivalent (IIV3), PF 0.5mL (44016) 11/09/2024, 11/09/2024    TDAP 10/06/2015, 11/20/2018, 11/02/2021       Past Medical History[1]   Past Surgical History[2]   Social Hx on file[3]       Review of Systems   Constitutional:  Positive for fatigue. Negative for chills and fever.   HENT:  Positive for ear pain and voice change. Negative for hearing loss, sinus pain, sore throat and trouble swallowing.    Eyes:  Negative for pain and visual disturbance.   Respiratory:  Positive for cough. Negative for chest tightness and shortness of breath.    Cardiovascular:  Negative for chest pain, palpitations and leg swelling.   Gastrointestinal:  Negative for abdominal pain, constipation, diarrhea, nausea and vomiting.   Endocrine: Negative for cold intolerance and heat intolerance.   Genitourinary:  Negative for dysuria and hematuria.   Musculoskeletal:  Negative for back pain and joint swelling.   Skin:  Negative for rash.   Allergic/Immunologic: Negative for environmental allergies.   Neurological:  Negative for weakness, numbness and headaches.   Hematological:  Does not bruise/bleed easily.   Psychiatric/Behavioral:  Negative for dysphoric mood and sleep disturbance. The patient is not nervous/anxious.             Current Medications[4]  Allergies:Allergies[5]    PHYSICAL EXAM:   Physical Exam  Constitutional:       Appearance: Normal appearance. She is well-developed.   HENT:      Head: Normocephalic.      Right Ear: Tympanic membrane normal.      Ears:      Comments: Acute otitis media of left ear.  Left ear with bulging, opaque, erythematous, and a yellowish hue.            Nose: Nose normal.      Mouth/Throat:      Mouth: Mucous membranes are moist.      Pharynx: No oropharyngeal exudate or posterior oropharyngeal erythema.   Eyes:      General:         Right eye: No discharge.         Left eye: No discharge.      Pupils: Pupils are equal, round, and reactive to light.   Cardiovascular:      Rate and Rhythm: Normal rate and regular rhythm.      Heart sounds: Normal heart sounds. No murmur heard.     No friction rub. No gallop.   Pulmonary:      Effort: Pulmonary effort is normal. No respiratory distress.      Breath sounds: Normal breath sounds. No wheezing, rhonchi or rales.   Abdominal:      General: Bowel sounds are normal. There is no distension.      Palpations: Abdomen is soft. There is no mass.      Tenderness: There is no abdominal tenderness. There is no right CVA tenderness, left CVA tenderness or guarding.   Musculoskeletal:         General: No tenderness.      Cervical back: Normal range of motion and neck supple. No tenderness.      Right lower leg: No edema.      Left lower leg: No edema.   Lymphadenopathy:      Cervical: No cervical adenopathy.   Skin:     General: Skin is warm and dry.      Findings: No rash.   Neurological:      Mental Status: She is alert and oriented to person, place, and time.      Coordination: Coordination normal.      Gait: Gait normal.   Psychiatric:         Mood and Affect: Mood normal.         Behavior: Behavior normal.         Thought Content: Thought content normal.         Judgment: Judgment normal.       /73 (BP Location: Right arm, Patient Position: Sitting, Cuff Size: adult)   Pulse 67   Temp 97.2 °F (36.2 °C)  (Temporal)   Resp 16   Ht 5' 6.5\" (1.689 m)   Wt 125 lb 12.8 oz (57.1 kg)   LMP 03/24/2025   SpO2 100%   BMI 20.00 kg/m²   Wt Readings from Last 2 Encounters:   04/17/25 125 lb 12.8 oz (57.1 kg)   02/20/24 125 lb (56.7 kg)     Body mass index is 20 kg/m².(2)  Lab Results   Component Value Date    WBC 5.9 12/20/2022    RBC 4.54 12/20/2022    HGB 14.2 12/20/2022    HCT 43.3 12/20/2022    MCV 95.4 12/20/2022    MCH 31.3 12/20/2022    MCHC 32.8 12/20/2022    RDW 11.2 12/20/2022    .0 12/20/2022    MPV 8.8 01/24/2019      Lab Results   Component Value Date    GLU 77 12/20/2022    BUN 12 12/20/2022    BUNCREA 13.3 12/20/2022    CREATSERUM 0.90 12/20/2022    ANIONGAP 9 12/20/2022    GFRNAA >60 01/24/2019    GFRAA >60 01/24/2019    CA 9.7 12/20/2022    OSMOCALC 293 12/20/2022    ALKPHO 68 12/20/2022    AST 18 12/20/2022    ALT 31 12/20/2022    BILT 0.5 12/20/2022    TP 7.5 12/20/2022    ALB 4.2 12/20/2022    GLOBULIN 3.3 12/20/2022     12/20/2022    K 3.7 12/20/2022     12/20/2022    CO2 28.0 12/20/2022      Lab Results   Component Value Date     07/07/2021    A1C 5.2 07/07/2021      Lab Results   Component Value Date    CHOLEST 155 12/20/2022    TRIG 43 12/20/2022    HDL 83 (H) 12/20/2022    LDL 62 12/20/2022    VLDL 6 12/20/2022    NONHDLC 72 12/20/2022      Lab Results   Component Value Date    TSH 1.380 12/20/2022                ASSESSMENT/PLAN:     Assessment & Plan  Left ear pain  Acute otitis media of left ear.  Left ear with bulging, opaque, erythematous, and a yellowish hue.       Plan  amoxicillin clavulanate 875-125 MG Oral Tab Take 1 tablet by mouth 2 (two) times daily for 10 days. 20 tablet   Apply warm compresses to infected ear     amoxicillin clavulanate 875-125 MG Oral Tab          Take 1 tablet by mouth 2 (two) times daily for 10 days., Normal, Disp-20 tablet, R-0       fluticasone propionate 50 MCG/ACT Nasal Suspension         2 sprays by Each Nare route daily., Normal,  Disp-1 each, R-0                No orders of the defined types were placed in this encounter.      Meds This Visit:  Requested Prescriptions     Signed Prescriptions Disp Refills    amoxicillin clavulanate 875-125 MG Oral Tab 20 tablet 0     Sig: Take 1 tablet by mouth 2 (two) times daily for 10 days.    fluticasone propionate 50 MCG/ACT Nasal Suspension 1 each 0     Si sprays by Each Nare route daily.       Imaging & Referrals:  None         CARLIE Jacobo        [1]   Past Medical History:   Amenorrhea    No menses for 4-6 months    Ankylosing spondylitis (HCC)    Annual physical exam    Arthritis    Dysmenorrhea    Since start of menses    Encounter for screening mammogram for malignant neoplasm of breast    History of chicken pox    Human papilloma virus infection    Pt has a Hx of abnormal paps.  Last pap done 14  LGSIL  HPV Positive    Pain, ear, bilateral    Temporary low platelet count    149 @ 28 wks. Repeat 1 mn_______________    Viral upper respiratory tract infection   [2]   Past Surgical History:  Procedure Laterality Date    Colposcopy, cervix w/upper adjacent vagina; w/biopsy(s), cervix  2010    5/5/10 JACQUELINE 1      , ,     Other surgical history  2014    Sinus surgery   [3]   Social History  Socioeconomic History    Marital status:      Spouse name: Calixto De Jesus    Number of children: 2    Years of education: 18    Highest education level: Master's degree (e.g., MA, MS, Mariaelena, MEd, MSW, DAVID)   Occupational History    Occupation: Stay at home mom   Tobacco Use    Smoking status: Never    Smokeless tobacco: Never   Vaping Use    Vaping status: Never Used   Substance and Sexual Activity    Alcohol use: Not Currently     Alcohol/week: 3.0 standard drinks of alcohol     Types: 3 Glasses of wine per week     Comment: prior to pregnancy 2 glasses weekly     Drug use: No   Other Topics Concern     Service No    Blood Transfusions No    Caffeine Concern Yes      Comment: 1 cup daily    Occupational Exposure No    Stress Concern Yes     Comment: death of mother    Weight Concern No    Special Diet No     Comment: No fish    Exercise Yes     Comment: chasing children    Bike Helmet Yes    Seat Belt Yes    Grew up on a farm No    History of tanning Yes    Outdoor occupation No    Breast feeding No    Reaction to local anesthetic No    Pt has a pacemaker No    Pt has a defibrillator No   [4]   Current Outpatient Medications   Medication Sig Dispense Refill    amoxicillin clavulanate 875-125 MG Oral Tab Take 1 tablet by mouth 2 (two) times daily for 10 days. 20 tablet 0    fluticasone propionate 50 MCG/ACT Nasal Suspension 2 sprays by Each Nare route daily. 1 each 0    Ascorbic Acid (VITAMIN C) 100 MG Oral Tab Take 1 tablet (100 mg total) by mouth daily.      Cholecalciferol (VITAMIN D) 1000 UNITS Oral Cap Take  by mouth.      PRENATAL MULTI +DHA (PNV WITH DHA) 27-0.8-228 MG Oral Cap Take 1 capsule by mouth daily.      neomycin-polymyxin-hydrocortisone 3.5-94020-1 Otic Solution Place 4 drops in ear(s) 4 (four) times daily. (Patient not taking: Reported on 4/17/2025) 10 mL 0    CALCIUM OR Take 1,200 mg by mouth daily. (Patient not taking: Reported on 4/17/2025)     [5] No Known Allergies

## 2025-05-12 ENCOUNTER — OFFICE VISIT (OUTPATIENT)
Dept: INTERNAL MEDICINE CLINIC | Facility: CLINIC | Age: 45
End: 2025-05-12
Payer: COMMERCIAL

## 2025-05-12 VITALS
WEIGHT: 126.38 LBS | OXYGEN SATURATION: 98 % | DIASTOLIC BLOOD PRESSURE: 63 MMHG | HEIGHT: 66.5 IN | TEMPERATURE: 98 F | BODY MASS INDEX: 20.07 KG/M2 | SYSTOLIC BLOOD PRESSURE: 96 MMHG | RESPIRATION RATE: 16 BRPM | HEART RATE: 76 BPM

## 2025-05-12 DIAGNOSIS — H92.12 OTORRHEA OF LEFT EAR: ICD-10-CM

## 2025-05-12 DIAGNOSIS — E53.9 VITAMIN B DEFICIENCY: ICD-10-CM

## 2025-05-12 DIAGNOSIS — Z12.4 CERVICAL CANCER SCREENING: Primary | ICD-10-CM

## 2025-05-12 DIAGNOSIS — Z00.00 ANNUAL PHYSICAL EXAM: ICD-10-CM

## 2025-05-12 DIAGNOSIS — E55.9 VITAMIN D DEFICIENCY: ICD-10-CM

## 2025-05-12 NOTE — PROGRESS NOTES
HPI:    Patient ID: Paris De Jesus is a 44 year old female.    HPI Physical Exam  44 year old female is here for annual physical exam   She is here to discuss health maintenance issues.       Left Ear Drainage  Denies any pain  Has liquid discharge from left ear.  Her dentist is suggesting she has TMJ    Vitals:    05/12/25 0942   BP: 96/63   Pulse: 76   Resp: 16   Temp: 97.6 °F (36.4 °C)        Immunization History   Administered Date(s) Administered    Covid-19 Vaccine Moderna 50 Mcg/0.25 Ml 12/04/2021    Covid-19 Vaccine Pfizer 30 mcg/0.3 ml 03/19/2021, 04/16/2021    FLULAVAL 6 months & older 0.5 ml Prefilled syringe (22617) 10/11/2018, 10/06/2020, 10/08/2021    FLUZONE 6 months and older PFS 0.5 ml (48375) 10/06/2015, 11/04/2022, 01/11/2024    Influenza 01/05/2018    Influenza Vaccine, trivalent (IIV3), PF 0.5mL (81354) 11/09/2024, 11/09/2024    TDAP 10/06/2015, 11/20/2018, 11/02/2021       Past Medical History[1]   Past Surgical History[2]   Social Hx on file[3]       Review of Systems   Constitutional:  Positive for fatigue. Negative for chills and fever.   HENT:  Positive for ear discharge. Negative for congestion, ear pain, hearing loss, postnasal drip, sinus pressure, sinus pain, sore throat, trouble swallowing and voice change.    Eyes:  Negative for pain, redness and visual disturbance.   Respiratory:  Negative for cough, chest tightness and shortness of breath.    Cardiovascular:  Negative for chest pain, palpitations and leg swelling.   Gastrointestinal:  Negative for abdominal pain, constipation, diarrhea, nausea and vomiting.   Endocrine: Negative for cold intolerance and heat intolerance.   Genitourinary:  Negative for dysuria and hematuria.   Musculoskeletal:  Positive for back pain. Negative for joint swelling.   Skin:  Negative for rash.   Allergic/Immunologic: Positive for environmental allergies.   Neurological:  Negative for weakness, numbness and headaches.   Hematological:  Does not  bruise/bleed easily.   Psychiatric/Behavioral:  Negative for dysphoric mood and sleep disturbance. The patient is not nervous/anxious.             Current Medications[4]  Allergies:Allergies[5]   PHYSICAL EXAM:   Physical Exam  Constitutional:       Appearance: Normal appearance. She is well-developed.   HENT:      Head: Normocephalic.      Right Ear: Tympanic membrane normal.      Left Ear: Tympanic membrane normal.      Nose: Nose normal.      Mouth/Throat:      Mouth: Mucous membranes are moist.      Pharynx: No oropharyngeal exudate or posterior oropharyngeal erythema.   Eyes:      General:         Right eye: No discharge.         Left eye: No discharge.      Pupils: Pupils are equal, round, and reactive to light.   Cardiovascular:      Rate and Rhythm: Normal rate and regular rhythm.      Heart sounds: Normal heart sounds. No murmur heard.     No friction rub. No gallop.   Pulmonary:      Effort: Pulmonary effort is normal. No respiratory distress.      Breath sounds: Normal breath sounds. No wheezing, rhonchi or rales.   Abdominal:      General: Bowel sounds are normal. There is no distension.      Palpations: Abdomen is soft. There is no mass.      Tenderness: There is no abdominal tenderness. There is no right CVA tenderness, left CVA tenderness or guarding.   Musculoskeletal:         General: No tenderness.      Cervical back: Normal range of motion and neck supple. No tenderness.      Right lower leg: No edema.      Left lower leg: No edema.   Lymphadenopathy:      Cervical: No cervical adenopathy.   Skin:     General: Skin is warm and dry.      Findings: No rash.   Neurological:      Mental Status: She is alert and oriented to person, place, and time.      Coordination: Coordination normal.      Gait: Gait normal.   Psychiatric:         Mood and Affect: Mood normal.         Behavior: Behavior normal.         Thought Content: Thought content normal.         Judgment: Judgment normal.       BP 96/63 (BP  Location: Right arm, Patient Position: Sitting, Cuff Size: adult)   Pulse 76   Temp 97.6 °F (36.4 °C) (Temporal)   Resp 16   Ht 5' 6.5\" (1.689 m)   Wt 126 lb 6.4 oz (57.3 kg)   LMP 03/24/2025   SpO2 98%   BMI 20.10 kg/m²   Wt Readings from Last 2 Encounters:   05/12/25 126 lb 6.4 oz (57.3 kg)   04/17/25 125 lb 12.8 oz (57.1 kg)     Body mass index is 20.1 kg/m².(2)  Lab Results   Component Value Date    WBC 5.9 12/20/2022    RBC 4.54 12/20/2022    HGB 14.2 12/20/2022    HCT 43.3 12/20/2022    MCV 95.4 12/20/2022    MCH 31.3 12/20/2022    MCHC 32.8 12/20/2022    RDW 11.2 12/20/2022    .0 12/20/2022    MPV 8.8 01/24/2019      Lab Results   Component Value Date    GLU 77 12/20/2022    BUN 12 12/20/2022    BUNCREA 13.3 12/20/2022    CREATSERUM 0.90 12/20/2022    ANIONGAP 9 12/20/2022    GFRNAA >60 01/24/2019    GFRAA >60 01/24/2019    CA 9.7 12/20/2022    OSMOCALC 293 12/20/2022    ALKPHO 68 12/20/2022    AST 18 12/20/2022    ALT 31 12/20/2022    BILT 0.5 12/20/2022    TP 7.5 12/20/2022    ALB 4.2 12/20/2022    GLOBULIN 3.3 12/20/2022     12/20/2022    K 3.7 12/20/2022     12/20/2022    CO2 28.0 12/20/2022      Lab Results   Component Value Date     07/07/2021    A1C 5.2 07/07/2021      Lab Results   Component Value Date    CHOLEST 155 12/20/2022    TRIG 43 12/20/2022    HDL 83 (H) 12/20/2022    LDL 62 12/20/2022    VLDL 6 12/20/2022    NONHDLC 72 12/20/2022      Lab Results   Component Value Date    TSH 1.380 12/20/2022                ASSESSMENT/PLAN:     Assessment & Plan  Cervical cancer screening    Orders:    ThinPrep PAP Smear; Future    Hpv Dna  High Risk , Thin Prep Collect; Future    Annual physical exam  Normal exam.  Labs as ordered.  Skin check normal.  No significant abnormal nevi.  Breast exam completed-no palpable abnormalities, discharge from the nipples or axillary adenopathy.  No cervical or inguinal lymphadenopathy.  Hernial orifices intact.  Pelvic exam completed-no  cervical movement tenderness, adnexal palpable abnormalities.  No cystocele, rectocele or uterine descent.-  Pap DUE   Immunizations- Up to date    Orders:    Comp Metabolic Panel (14); Future    Lipid Panel; Future    Vitamin D, 25-Hydroxy; Future    Vitamin B12; Future    TSH W Reflex To Free T4; Future    CBC, NO DIFFERENTIAL/PLATELET; Future    Vitamin D deficiency    Orders:    Vitamin D, 25-Hydroxy; Future    Vitamin B deficiency    Orders:    Vitamin B12; Future    Otorrhea of left ear    Orders:    ENT Referral - [facility])        Orders Placed This Encounter   Procedures    Hpv Dna  High Risk , Thin Prep Collect    Comp Metabolic Panel (14)    Lipid Panel    Vitamin D, 25-Hydroxy    Vitamin B12    TSH W Reflex To Free T4    CBC, NO DIFFERENTIAL/PLATELET    ThinPrep PAP Smear       Meds This Visit:  Requested Prescriptions      No prescriptions requested or ordered in this encounter       Imaging & Referrals:  ENT - INTERNAL         CARLIE Jacobo        [1]   Past Medical History:   Amenorrhea    No menses for 4-6 months    Ankylosing spondylitis (HCC)    Annual physical exam    Arthritis    Dysmenorrhea    Since start of menses    Encounter for screening mammogram for malignant neoplasm of breast    History of chicken pox    Human papilloma virus infection    Pt has a Hx of abnormal paps.  Last pap done 14  LGSIL  HPV Positive    Pain, ear, bilateral    Temporary low platelet count    149 @ 28 wks. Repeat 1 mn_______________    Viral upper respiratory tract infection   [2]   Past Surgical History:  Procedure Laterality Date    Colposcopy, cervix w/upper adjacent vagina; w/biopsy(s), cervix  2010    5/5/10 JACQUELINE 1      , ,     Other surgical history  2014    Sinus surgery   [3]   Social History  Socioeconomic History    Marital status:      Spouse name: Calixto De Jesus    Number of children: 2    Years of education: 18    Highest education level:  Master's degree (e.g., MA, MS, Mariaelena, MEd, MSW, DAVID)   Occupational History    Occupation: Stay at home mom   Tobacco Use    Smoking status: Never    Smokeless tobacco: Never   Vaping Use    Vaping status: Never Used   Substance and Sexual Activity    Alcohol use: Not Currently     Alcohol/week: 3.0 standard drinks of alcohol     Types: 3 Glasses of wine per week     Comment: prior to pregnancy 2 glasses weekly     Drug use: No   Other Topics Concern     Service No    Blood Transfusions No    Caffeine Concern Yes     Comment: 1 cup daily    Occupational Exposure No    Stress Concern Yes     Comment: death of mother    Weight Concern No    Special Diet No     Comment: No fish    Exercise Yes     Comment: chasing children    Bike Helmet Yes    Seat Belt Yes    Grew up on a farm No    History of tanning Yes    Outdoor occupation No    Breast feeding No    Reaction to local anesthetic No    Pt has a pacemaker No    Pt has a defibrillator No   [4]   Current Outpatient Medications   Medication Sig Dispense Refill    Multiple Vitamins-Minerals (MULTI VITAMIN/MINERALS) Oral Tab Take by mouth.      fluticasone propionate 50 MCG/ACT Nasal Suspension 2 sprays by Each Nare route daily. (Patient not taking: Reported on 5/12/2025) 1 each 0    neomycin-polymyxin-hydrocortisone 3.5-35090-9 Otic Solution Place 4 drops in ear(s) 4 (four) times daily. (Patient not taking: Reported on 5/12/2025) 10 mL 0    Ascorbic Acid (VITAMIN C) 100 MG Oral Tab Take 1 tablet (100 mg total) by mouth daily. (Patient not taking: Reported on 5/12/2025)      CALCIUM OR Take 1,200 mg by mouth daily. (Patient not taking: Reported on 5/12/2025)      Cholecalciferol (VITAMIN D) 1000 UNITS Oral Cap Take  by mouth. (Patient not taking: Reported on 5/12/2025)      PRENATAL MULTI +DHA (PNV WITH DHA) 27-0.8-228 MG Oral Cap Take 1 capsule by mouth daily. (Patient not taking: Reported on 5/12/2025)     [5] No Known Allergies

## 2025-05-12 NOTE — ASSESSMENT & PLAN NOTE
Normal exam.  Labs as ordered.  Skin check normal.  No significant abnormal nevi.  Breast exam completed-no palpable abnormalities, discharge from the nipples or axillary adenopathy.  No cervical or inguinal lymphadenopathy.  Hernial orifices intact.  Pelvic exam completed-no cervical movement tenderness, adnexal palpable abnormalities.  No cystocele, rectocele or uterine descent.-  Pap DUE 2026  Immunizations- Up to date    Orders:    Comp Metabolic Panel (14); Future    Lipid Panel; Future    Vitamin D, 25-Hydroxy; Future    Vitamin B12; Future    TSH W Reflex To Free T4; Future    CBC, NO DIFFERENTIAL/PLATELET; Future

## 2025-05-19 ENCOUNTER — LAB ENCOUNTER (OUTPATIENT)
Dept: LAB | Facility: HOSPITAL | Age: 45
End: 2025-05-19
Attending: NURSE PRACTITIONER
Payer: COMMERCIAL

## 2025-05-19 DIAGNOSIS — E55.9 VITAMIN D DEFICIENCY: ICD-10-CM

## 2025-05-19 DIAGNOSIS — Z00.00 ANNUAL PHYSICAL EXAM: ICD-10-CM

## 2025-05-19 DIAGNOSIS — E53.9 VITAMIN B DEFICIENCY: ICD-10-CM

## 2025-05-19 LAB
ALBUMIN SERPL-MCNC: 5.2 G/DL (ref 3.2–4.8)
ALBUMIN/GLOB SERPL: 2.4 {RATIO} (ref 1–2)
ALP LIVER SERPL-CCNC: 46 U/L (ref 37–98)
ALT SERPL-CCNC: 23 U/L (ref 10–49)
ANION GAP SERPL CALC-SCNC: 7 MMOL/L (ref 0–18)
AST SERPL-CCNC: 23 U/L (ref ?–34)
BILIRUB SERPL-MCNC: 0.7 MG/DL (ref 0.3–1.2)
BUN BLD-MCNC: 12 MG/DL (ref 9–23)
BUN/CREAT SERPL: 12 (ref 10–20)
CALCIUM BLD-MCNC: 9.5 MG/DL (ref 8.7–10.4)
CHLORIDE SERPL-SCNC: 105 MMOL/L (ref 98–112)
CHOLEST SERPL-MCNC: 176 MG/DL (ref ?–200)
CO2 SERPL-SCNC: 27 MMOL/L (ref 21–32)
CREAT BLD-MCNC: 1 MG/DL (ref 0.55–1.02)
DEPRECATED RDW RBC AUTO: 40.6 FL (ref 35.1–46.3)
EGFRCR SERPLBLD CKD-EPI 2021: 72 ML/MIN/1.73M2 (ref 60–?)
ERYTHROCYTE [DISTWIDTH] IN BLOOD BY AUTOMATED COUNT: 11.5 % (ref 11–15)
FASTING PATIENT LIPID ANSWER: YES
FASTING STATUS PATIENT QL REPORTED: YES
GLOBULIN PLAS-MCNC: 2.2 G/DL (ref 2–3.5)
GLUCOSE BLD-MCNC: 91 MG/DL (ref 70–99)
HCT VFR BLD AUTO: 43.6 % (ref 35–48)
HDLC SERPL-MCNC: 82 MG/DL (ref 40–59)
HGB BLD-MCNC: 14.5 G/DL (ref 12–16)
LDLC SERPL CALC-MCNC: 82 MG/DL (ref ?–100)
MCH RBC QN AUTO: 31.9 PG (ref 26–34)
MCHC RBC AUTO-ENTMCNC: 33.3 G/DL (ref 31–37)
MCV RBC AUTO: 95.8 FL (ref 80–100)
NONHDLC SERPL-MCNC: 94 MG/DL (ref ?–130)
OSMOLALITY SERPL CALC.SUM OF ELEC: 287 MOSM/KG (ref 275–295)
PLATELET # BLD AUTO: 161 10(3)UL (ref 150–450)
POTASSIUM SERPL-SCNC: 4 MMOL/L (ref 3.5–5.1)
PROT SERPL-MCNC: 7.4 G/DL (ref 5.7–8.2)
RBC # BLD AUTO: 4.55 X10(6)UL (ref 3.8–5.3)
SODIUM SERPL-SCNC: 139 MMOL/L (ref 136–145)
TRIGL SERPL-MCNC: 62 MG/DL (ref 30–149)
TSI SER-ACNC: 1.51 UIU/ML (ref 0.55–4.78)
VIT B12 SERPL-MCNC: 923 PG/ML (ref 211–911)
VIT D+METAB SERPL-MCNC: 29.9 NG/ML (ref 30–100)
VLDLC SERPL CALC-MCNC: 10 MG/DL (ref 0–30)
WBC # BLD AUTO: 5.4 X10(3) UL (ref 4–11)

## 2025-05-19 PROCEDURE — 80061 LIPID PANEL: CPT

## 2025-05-19 PROCEDURE — 82306 VITAMIN D 25 HYDROXY: CPT

## 2025-05-19 PROCEDURE — 36415 COLL VENOUS BLD VENIPUNCTURE: CPT

## 2025-05-19 PROCEDURE — 82607 VITAMIN B-12: CPT

## 2025-05-19 PROCEDURE — 80053 COMPREHEN METABOLIC PANEL: CPT

## 2025-05-19 PROCEDURE — 84443 ASSAY THYROID STIM HORMONE: CPT

## 2025-05-19 PROCEDURE — 85027 COMPLETE CBC AUTOMATED: CPT

## (undated) NOTE — ED AVS SNAPSHOT
[facility] Emergency Department    [address]     [address] [place] [address]    Phone:  [phone]    Fax:  [phone]           Laquetta Severe   MRN: [id number]    Department:  [facility] Emergency Department   Date of Visit:  5/20/ and Class Registration line at [phone] or find a doctor online by visiting [email or web address].    IF THERE IS ANY CHANGE OR WORSENING OF YOUR CONDITION, CALL YOUR PRIMARY CARE PHYSICIAN AT ONCE OR RETURN IMMEDIATELY TO [address] [place] [address].     If

## (undated) NOTE — ED AVS SNAPSHOT
[facility] Emergency Department    [place] [address]    Phone:  [phone]    Fax:  [phone]           Norton Natalie   MRN: [id number]    Department:  [facility] Emergency Department   Date of Visit:  5/20/ Quantity:  30 tablet   Commonly known as:  MOTRIN   Take 1 tablet (600 mg total) by mouth every 8 (eight) hours as needed for Pain or Fever.          CONTINUE taking these medications     Breast Pump Misc   Quantity:  1 each   AS DIRECTED Please supply a do return to your personal doctor) about any new or lasting problems. The primary care or specialist physician may see patients referred from the [facility] Emergency Department. Follow-up care is at the discretion of that Physician.   If you n Aleksey Wright [address] ([address] [place] [address]) [phone]   Melody Wright [address] [facility] [employer] ([place] [address]) [address] [facility] [address] Karly May  ([place] [address]) [phone]                Additional Information

## (undated) NOTE — Clinical Note
Follow-up Growth ultrasound at 32 weeks. Weekly NST's at 36 weeks.  Level II ultrasound at ~20 weeks 3 hour GTT

## (undated) NOTE — LETTER
VACCINE ADMINISTRATION RECORD  PARENT / GUARDIAN APPROVAL  Date: 2018  Vaccine administered to: Liz Kirkland     : 10/16/1980    MRN: [id number]    A copy of the appropriate Centers for Disease Control and Prevention Vaccine Information statemen

## (undated) NOTE — ED AVS SNAPSHOT
[facility] Emergency Department    [place] [address]    Phone:  [phone]    Fax:  [phone]           Dolores Hahn   MRN: [id number]    Department:  [facility] Emergency Department   Date of Visit:  6/16/ If you have difficulty scheduling your follow-up appointment as directed, please call our  at [phone]. Si tiene problemas para programar zohra vignesh de seguimiento según lo indicado, llame al encargado de jc al [phone].     It i continue to take your medications as instructed by your Primary Care doctor until you can check with your doctor. Please bring the medication list to your next doctor's appointment.     Any imaging studies and labs completed today can be reviewed in your [place] plans. To get signed up and covered, please call [phone] and ask to get set up for an insurance coverage that is in-network with [employer].         [employer]

## (undated) NOTE — MR AVS SNAPSHOT
After Visit Summary   2/28/2017    Prabha Nguyen    MRN: [id number]           Visit Information        Provider Department Dept Phone    2/28/2017  4:00 PM Kareem Ordoñez CNM [facility] [phone]      Your Vitals Were     BP Pulse Ht Wt BMI box under the *New User? section. Enter your [employer] Activation Code exactly as it appears below. You will not need to use this code after you have completed the sign-up process.  If you do not sign up before the expiration date, you must request a new

## (undated) NOTE — ED AVS SNAPSHOT
[facility] Emergency Department    [address]     [address] [place] [address]    Phone:  [phone]    Fax:  [phone]           Mata Miller   MRN: [id number]    Department:  [facility] Emergency Department   Date of Visit:  6/16/ and Class Registration line at [phone] or find a doctor online by visiting [email or web address].    IF THERE IS ANY CHANGE OR WORSENING OF YOUR CONDITION, CALL YOUR PRIMARY CARE PHYSICIAN AT ONCE OR RETURN IMMEDIATELY TO [address] [place] [address].     If

## (undated) NOTE — LETTER
VACCINE ADMINISTRATION RECORD  PARENT / GUARDIAN APPROVAL  Date: 2021  Vaccine administered to: Angeles Jeffries     : 10/16/1980    MRN: [id number]    A copy of the appropriate Centers for Disease Control and Prevention Vaccine Information statement

## (undated) NOTE — LETTER
3/13/2018              [address] [place] [address]        [address] [id number]         Dear Ysabel,    It was a pleasure to see you. Your PAP test with HPV was normal. Current guidelines would recommend a repeat Pap with HPV in 5 years.   An annual

## (undated) NOTE — Clinical Note
IUP at 14w5d  Subchorionic hemorrhage Advanced maternal age, desires cell free DNA screen Increased GDM screen; hemoglobin A1c was 5.1  RECOMMENDATIONS: Continue care with Ms. Frazier Follow-up Growth ultrasound at 32 weeks. Weekly NST's at 36 weeks.  Radha

## (undated) NOTE — MR AVS SNAPSHOT
[facility]  [phone]               Thank you for choosing us for your health care visit with Catherine Denton CNM.   We are glad to serve you and happy to provide you with this summary